# Patient Record
Sex: FEMALE | Race: WHITE | NOT HISPANIC OR LATINO | Employment: OTHER | URBAN - NONMETROPOLITAN AREA
[De-identification: names, ages, dates, MRNs, and addresses within clinical notes are randomized per-mention and may not be internally consistent; named-entity substitution may affect disease eponyms.]

---

## 2017-01-10 LAB
ALBUMIN SERPL-MCNC: 3.9 GM/DL (ref 3.4–4.8)
ALP SERPL-CCNC: 133 U/L (ref 38–126)
ALT SERPL-CCNC: 29 U/L (ref 9–52)
ANION GAP SERPL CALCULATED.3IONS-SCNC: 14 MMOL/L (ref 5–15)
APPEARANCE UR: CLEAR
APTT PPP: 38.2 SECONDS (ref 20–40.3)
AST SERPL-CCNC: 29 U/L (ref 14–36)
BASOPHILS # BLD AUTO: 0.03 X1000/UL (ref 0–0.2)
BASOPHILS NFR BLD AUTO: 0.5 % (ref 0–2)
BILIRUB SERPL-MCNC: 0.5 MG/DL (ref 0.2–1.3)
BUN SERPL-MCNC: 15 MG/DL (ref 7–21)
CALCIUM SERPL-MCNC: 9.2 MG/DL (ref 8.4–10.2)
CHLORIDE SERPL-SCNC: 93 MMOL/L (ref 95–110)
CK MB SERPL-MCNC: 2.2 NG/ML (ref 0–5)
CK MB SERPL-MCNC: 2.4 NG/ML (ref 0–5)
CK SERPL-CCNC: 47 U/L (ref 30–135)
CK SERPL-CCNC: 47 U/L (ref 30–135)
CO2 SERPL-SCNC: 22 MMOL/L (ref 22–31)
COLOR UR: YELLOW
CONV AUTO IG#: 0.01 X1000/UL (ref 0.01–0.02)
CONV AUTO IG%: 0.2 % (ref 0–0.5)
CREAT SERPL-MCNC: 1 MG/DL (ref 0.5–1)
EOSINOPHIL # BLD AUTO: 0.04 X1000/UL (ref 0–0.7)
EOSINOPHIL NFR BLD AUTO: 0.6 % (ref 0–7)
ERYTHROCYTE [DISTWIDTH] IN BLOOD: 15.4 % (ref 11.5–14.5)
GLUCOSE SERPL-MCNC: 123 MG/DL (ref 60–100)
GLUCOSE UR STRIP-MCNC: NEGATIVE MG/DL
GRANULOCYTES # BLD AUTO: 4.53 X1000/UL (ref 2–8.6)
GRANULOCYTES NFR BLD AUTO: 69.9 % (ref 37–80)
HCT VFR BLD CALC: 41 % (ref 35–45)
HGB BLD-MCNC: 14 GM/DL (ref 12–15.5)
INR PPP: 2.2 (ref 0.8–1.2)
KETONES UR QL STRIP: NEGATIVE
LEUKOCYTE ESTERASE UR QL STRIP: NEGATIVE
LIPASE SERPL-CCNC: 65 U/L (ref 23–300)
LYMPHOCYTES # BLD AUTO: 1 X1000/UL (ref 0.6–4.2)
LYMPHOCYTES NFR BLD AUTO: 15.5 % (ref 10–50)
MAGNESIUM SERPL-MCNC: 1.76 MG/DL (ref 1.6–2.3)
MCH RBC QN: 29.2 PG (ref 26–34)
MCHC RBC-ENTMCNC: 34.1 GM/DL (ref 31.4–36)
MCV RBC: 85.6 FL (ref 80–98)
MONOCYTES # BLD AUTO: 0.86 X1000/UL (ref 0–0.9)
MONOCYTES NFR BLD AUTO: 13.3 % (ref 0–12)
NITRITE UR QL STRIP: NEGATIVE
NRBC BLD AUTO-RTO: 0 % (ref 0–0.2)
NRBC SPEC MANUAL: 0 X1000/UL
NT-PROBNP SERPL-MCNC: 1420 PG/ML (ref 0–1800)
PH UR STRIP: 7.5 PH UNITS (ref 5–9)
PLATELET # BLD: 293 X1000/MM3 (ref 150–450)
PMV BLD: 9.5 FL (ref 8–12)
POTASSIUM SERPL-SCNC: 4.1 MMOL/L (ref 3.5–5.1)
PROT SERPL-MCNC: 7.3 GM/DL (ref 6.3–8.6)
PROT UR QL STRIP: NEGATIVE
PROTHROMBIN TIME: 23.6 SECONDS (ref 11.1–15.3)
RBC # BLD: 4.79 MEGA/MM3 (ref 3.77–5.16)
RBC # UR STRIP: NEGATIVE /UL
SODIUM SERPL-SCNC: 129 MMOL/L (ref 137–145)
SP GR UR STRIP: 1.01 (ref 1–1.03)
TROPONIN I SERPL-MCNC: 0.04 NG/ML (ref 0–0.03)
TROPONIN I SERPL-MCNC: 0.05 NG/ML (ref 0–0.03)
TSH SERPL-ACNC: 2.75 UIU/ML (ref 0.46–4.68)
UROBILINOGEN UR STRIP-MCNC: 1 EU/DL
WBC # BLD: 6.5 X1000/UL (ref 3.2–9.8)

## 2017-01-10 PROCEDURE — 9990

## 2017-01-10 PROCEDURE — 74176 CT ABD & PELVIS W/O CONTRAST: CPT

## 2017-01-10 PROCEDURE — 96375 TX/PRO/DX INJ NEW DRUG ADDON: CPT

## 2017-01-10 PROCEDURE — 96361 HYDRATE IV INFUSION ADD-ON: CPT

## 2017-01-10 PROCEDURE — 71010: CPT

## 2017-01-10 PROCEDURE — 96366 THER/PROPH/DIAG IV INF ADDON: CPT

## 2017-01-10 PROCEDURE — 9990 URINALYSIS

## 2017-01-10 PROCEDURE — 82553 CREATINE MB FRACTION: CPT

## 2017-01-10 PROCEDURE — 84484 ASSAY OF TROPONIN QUANT: CPT

## 2017-01-10 PROCEDURE — 9990 CBC

## 2017-01-10 PROCEDURE — 81003 URINALYSIS AUTO W/O SCOPE: CPT

## 2017-01-10 PROCEDURE — 85730 THROMBOPLASTIN TIME PARTIAL: CPT

## 2017-01-10 PROCEDURE — 85025 COMPLETE CBC W/AUTO DIFF WBC: CPT

## 2017-01-10 PROCEDURE — 85610 PROTHROMBIN TIME: CPT

## 2017-01-10 PROCEDURE — 83735 ASSAY OF MAGNESIUM: CPT

## 2017-01-10 PROCEDURE — 83880 ASSAY OF NATRIURETIC PEPTIDE: CPT

## 2017-01-10 PROCEDURE — 80053 COMPREHEN METABOLIC PANEL: CPT

## 2017-01-10 PROCEDURE — 96365 THER/PROPH/DIAG IV INF INIT: CPT

## 2017-01-10 PROCEDURE — 83690 ASSAY OF LIPASE: CPT

## 2017-01-10 PROCEDURE — 9990 COMPREHENSIVE METABOLIC PANEL

## 2017-01-10 PROCEDURE — 36415 COLL VENOUS BLD VENIPUNCTURE: CPT

## 2017-01-10 PROCEDURE — 93005 ELECTROCARDIOGRAM TRACING: CPT

## 2017-01-10 PROCEDURE — 99285 EMERGENCY DEPT VISIT HI MDM: CPT

## 2017-01-10 PROCEDURE — 82550 ASSAY OF CK (CPK): CPT

## 2017-01-10 PROCEDURE — 84443 ASSAY THYROID STIM HORMONE: CPT

## 2017-01-10 PROCEDURE — 96376 TX/PRO/DX INJ SAME DRUG ADON: CPT

## 2017-01-11 LAB
CK MB SERPL-MCNC: 2.3 NG/ML (ref 0–5)
CK MB SERPL-MCNC: 2.4 NG/ML (ref 0–5)
CK MB SERPL-MCNC: 2.6 NG/ML (ref 0–5)
CK SERPL-CCNC: 39 U/L (ref 30–135)
MAGNESIUM SERPL-MCNC: 1.81 MG/DL (ref 1.6–2.3)
PREALB SERPL-MCNC: 16.1 MG/DL (ref 17.6–36)
TROPONIN I SERPL-MCNC: 0.05 NG/ML (ref 0–0.03)
TROPONIN I SERPL-MCNC: 0.06 NG/ML (ref 0–0.03)
TROPONIN I SERPL-MCNC: 0.07 NG/ML (ref 0–0.03)

## 2017-01-11 PROCEDURE — 84134 ASSAY OF PREALBUMIN: CPT

## 2017-01-11 PROCEDURE — 9990

## 2017-01-11 PROCEDURE — 9990 PREALBUMIN

## 2017-01-11 PROCEDURE — 82553 CREATINE MB FRACTION: CPT

## 2017-01-11 PROCEDURE — 83735 ASSAY OF MAGNESIUM: CPT

## 2017-01-11 PROCEDURE — 93225 XTRNL ECG REC<48 HRS REC: CPT

## 2017-01-11 PROCEDURE — 82550 ASSAY OF CK (CPK): CPT

## 2017-01-11 PROCEDURE — 93005 ELECTROCARDIOGRAM TRACING: CPT

## 2017-01-11 PROCEDURE — 84484 ASSAY OF TROPONIN QUANT: CPT

## 2017-01-11 NOTE — ED PROVIDER NOTES
"  Clinical Report - Physicians  Patient: KATHLEEN CHEATHAM    MRN: [id number] [facility]  VisitID: [id number] [address] [place] [address], [place] [id number] [phone]  83y, F Registration Date/Time: 01/10/2017 20:35      Time Seen: 20:38 Jakub 10 2017; initial patient contact.    Arrived- By ambulance.  Historian- patient and EMS personnel.      HISTORY OF PRESENT ILLNESS  Chief Complaint: CHEST PAIN.  This started just prior to arrival today and is   still present.  Onset during light activity.  At its maximum, severity   described as moderate. When seen in the E.D., severity described as moderate.   Modifying factors. Not worsened by anything. Not relieved by anything.  It   is described as pressure, tightness and \"pain\" and it is described as located   in the central chest area.  No radiation.  No nausea, vomiting, difficulty   breathing or diaphoresis.  No additional chest pain.      Similar symptoms previously:     Recent medical care: Not recently seen/assessed.      REVIEW OF SYSTEMS  No fever, chills, cough, pedal edema or calf pain.  No fainting episodes,   headache, sore throat, blurred vision or abdominal pain.  No black stools,   difficulty with urination, skin rash, enlarged lymph nodes or joint pain.  No   bloody stools.      PAST HISTORY  See nurses notes.  History of dysrhythmia.  Pacemaker.  Valvular heart   disease.  Chronic obstructive pulmonary disease.  Gallstones.  (Congestive   heart failure.  Chronic obstructive pulmonary disease.  Emphysema.    Pneumonia.  Thyroid disease.  Gastroesophageal reflux.  Spinal Fracture.   Contusion. Bronchitis. Hypotension. Respiratory  failure. Influenza. Syncope.   Pacemaker. Valvular Heart Disease. Hyperlipidemia. UTI - Urinary Tract   Infection. Rectal Bleed. Pulmonary  embolism. Congestive Heart Failure. Heart   Disease. GI Disease.  GI Bleeding. Vomiting. Diarrhea. Depression. Hypertension.   Hypercholesterolemia. Thyroid Disease.).  "     Surgeries: Appendectomy.  Tonsillectomy.  (Heart catheterization.  Had   hysterectomy.  Pacemaker.  Valve replacement. Cardiac Surgery.   Cholecystectomy).      Medications:  Bystolic Oral (Tablet 10 mg) 1 tablet,  daily.  Furosemide Oral (Tablet 20 mg) 1 tablet,  2x a day.  Gabapentin Oral 100 mg,  3x a day.  Levothyroxine Sodium Oral (Tablet 50 mcg) 1 tablet,  daily.  Lisinopril Oral 20 mg,  daily.  Magnesium Oxide Oral (Tablet 400 mg) 1 tablet,  daily.  Multivital Oral,  daily.  Omeprazole Oral (Tablet Delayed Release 20 mg) 1 tablet,  at bedtime.  Potassium Chloride ER Oral (Capsule Extended Release 10 meq) 1 capsule,    daily.  Stool Softener Oral (Capsule 100 mg) 1 capsule,  at bedtime.  Warfarin Sodium Oral (Tablet 1 mg) 1 tablet sun e ur sat  1/2 tab mon wed fri.  Zolpidem Tartrate Oral (Tablet 10 mg) 1 tablet,  at bedtime.    Allergies:  Aspirin.  Bacitracin.  Latex.  PCN.      SOCIAL HISTORY  Former smoker.  Alcohol use. Patient is a recovering alcoholic.  No recent   travel.  Is a local resident.      FAMILY HISTORY  Diabetes in first-degree relative.      ADDITIONAL NOTES  The nursing notes have been reviewed with agreement regarding the chief   complaint, HPI, ROS, PMH and patient medications and allergies.      PHYSICAL EXAM  Vital Signs: 01/10/2017 21:58 BP: 134/77. HR: 122. RR: 18. O2 saturation:   96%.  01/10/2017 20:49 BP: 175/88. HR: 111. RR: 20. O2 saturation: 94%. Temp: 98.5   F. Pain level now: 0/10.  Have been reviewed and appear to be correct.    Hypertensive.  Tachycardic.  Oxygen saturation low.    Appearance: Alert.  Oriented X3.  No acute distress.  She is cooperative,   appears frail and elderly, appears comfortable and shows no apparent trauma.   She has normal color and is moderately nourished.    Eyes: Eyes normal inspection.    ENT: Pharynx normal.    Neck: Normal inspection.    CVS: Normal heart rate and rhythm.  Heart sounds normal.    Respiratory: No respiratory  distress.  Breath sounds normal.    Abdomen: Soft and nontender.  Bowel sounds normal.    Back: Normal external inspection.    Skin: Skin warm.  Normal skin color.  No rash.    Extremities: Extremities exhibit normal ROM.  No lower extremity edema.    Neuro: Oriented X 3.  No motor deficit.  No sensory deficit.      LABS, X-RAYS, AND EKG  EKG: Abnormal EKG.  Atrial fibrillation (narrow-complex) (ventricular rate   119).  Non-specific ST segment / T wave abnormalities.  Prior EKG   unavailable.  The study has been interpreted contemporaneously by me.  The   EKG appears to be a good tracing.    Laboratory Tests: Laboratory tests have been ordered, with results reviewed   and considered in the medical decision making process.     Protein, Misc Fluid:    (LUDMILA: 01/10/2017 21:38)  ( MsgRcvd 01/10/2017 21:38)   Final results     **Test**                                **Result**      **Flag**      **Units**      **(Reference)**   Read By                                                                           Report                                                                             [facility]                                   --                                                                                 Patient Name- KATHLEEN CHEATHAM                                   --                                                                                 ORDERING- GOYO HIDALGO                                   --                                                                                 ATTENDING- DR CASTELAN                                    --                                                                                 REFERRING-    ,                                        -----------------------                                   --                                                                                 PROCEDURE- Chest single view on 1/10/2017                                    --                                                                                 CLINICAL INDICATION-  Precordial chest pain, dizziness                           --                                                                                 COMPARISON- 12/16/2016                                    --                                                                                 FINDINGS- The patient is status post median sternotomy and valve                  replacement. 2-lead left subclavian pacemaker is noted in place. Heart            is upper limits normal for size. Vascular calcification is noted in a             tortuous aorta. Basilar predominant increased reticular interstitial              changes appear chronic in nature and likely represents a chronic                  interstitial lung disease. No acute pulmonary opacity is noted.                  --                                                                                 CONCLUSION- Findings likely representing a chronic interstitial lung              disease with no acute abnormality.                                   --                                                                                 Electronically Signed By- Jose Silveira MD On- 2017-01-10 21-36-33           --                                                                                      Reading Radiologist- JUSTINO SILVEIRA                                        Releasing Radiologist- JUSTINO SILVEIRA                                      Released Date Time- 01/10/17 2138                                       ------------------------------------------------------------------------------                                                                       Released By                             [facility]                                   VENIPUNCTURE:    (LUDMILA: 01/10/2017 20:40)  ( MsgRcvd 01/10/2017 20:40) In   Progress      CARDIAC  ENZYMES X 3:    (LUDMILA: 01/10/2017 20:40)  ( [facility] 01/10/[facility] 20:40)   In Progress      CK:    (LUDMILA: 01/10/2017 21:40)  ( [facility] 01/10/2017 22:15) Final results     **Test**                                **Result**      **Flag**      **Units**      **(Reference)**   CK                                      47                          U/L        ()          CK MB:    (LUDMILA: 01/10/2017 21:40)  ( [facility] 01/10/2017 22:24) Final results     **Test**                                **Result**      **Flag**      **Units**      **(Reference)**   CK-MB                                   2.2                         ng/ml      (0.0-5.0)         Troponin-I:    (LUDMILA: 01/10/2017 21:40)  ( [facility] 01/10/2017 22:24) Final   results     **Test**                                **Result**      **Flag**      **Units**      **(Reference)**   Troponin-I                              0.039           H           ng/ml      (0.000-0.034)    Rising and/or falling troponin values, in conjunction with symptoms, newECG   changes, or new imaging abnormalities suggestive of cardiac ischemiaare   consistent with acute myocardial injury. (Universal Definition ofMyocardial   Infarction, Circulation. 2007; 1463-1895.)      CK:    (LUDMILA: 01/10/2017 20:40)  ( [facility] 01/10/2017 20:41) In Progress      CK MB:    (LUDMILA: 01/10/2017 20:40)  ( [facility] 01/10/2017 20:41) In Progress      Troponin-I:    (LUDMILA: 01/10/2017 20:40)  ( [facility] 01/10/2017 20:41) In   Progress      CK:    (LUDMILA: 01/10/2017 20:40)  ( [facility] 01/10/2017 20:41) In Progress      CK MB:    (LUDMILA: 01/10/2017 20:40)  ( [facility] 01/10/2017 20:41) In Progress      Troponin-I:    (LUDMILA: 01/10/2017 20:40)  ( [facility] 01/10/2017 20:41) In   Progress      Urinalysis:    (LUDMILA: 01/10/2017 21:25)  ( MsgRcvd 01/10/2017 21:55) Final   results     **Test**                                **Result**      **Flag**      **Units**      **(Reference)**   Color                                   YELLOW                                  (YELLOW,Yellow   Clarity                                 CLEAR                                  (CLEAR,Clear)    Spec Gravity                            1.015                                  (1.003-1.030)    pH, Urine                               7.5             H           pH Units   (5.0-9.0)        pH Normal: 5.0 - 9.0     Leukocyte Esterase                      NEGATIVE                               (Negative,NEGA   Nitrites                                NEGATIVE                               (Negative,NEGA   Protein, Urine                          NEGATIVE                               (Negative,NEGA   Glucose, Urine                          NEGATIVE                    mg/dl      (Negative,NEGA   Ketone                                  NEGATIVE                               (Negative,NEGA   Urobilinogen                            1.0             H           EU/dl      (0.2)            Blood,Urine                             NEGATIVE                               (NEGATIVE)        CBC:    (LUDMILA: 01/10/2017 21:40)  ( MsgRcvd 01/10/2017 22:45) Final results     **Test**                                **Result**      **Flag**      **Units**      **(Reference)**   WBC                                     6.5                         x1000/uL   (3.2-9.8)        RBC                                     4.79                        evelyn/mm3   (3.77-5.16)      Hemoglobin                              14.0                        gm/dl      (12.0-15.5)      Hematocrit                              41.0                        %          (35.0-45.0)      MCV                                     85.6                        fl         (80.0-98.0)      MCH                                     29.2                        pg         (26.0-34.0)      MCHC                                    34.1                        gm/dl      (31.4-36.0)      RDW                                     15.4            H            %          (11.5-14.5)      Platelet Count                          293                           x1000/mm3       (150-450)        MPV                                     9.5                         fl         (8.0-12.0)       Auto Segs %                             69.9                        %          (37.0-80.0)      Auto Lymph %                            15.5                        %          (10.0-50.0)      Auto Mono %                             13.3            H           %          (0.0-12.0)       Auto Eos %                              0.6                         %          (0.0-7.0)        Auto Baso %                             0.5                         %          (0.0-2.0)        Auto IG%                                0.20                        %          (0.00-0.50)      Auto Segs #                             4.53                        x1000/uL   (2.00-8.60)      Auto Lymph #                            1.00                        x1000/uL   (0.60-4.20)      Auto Mono #                             0.86                        x1000/uL   (0.00-0.90)      Auto Eos #                              0.04                        x1000/uL   (0.00-0.70)      Auto Baso #                             0.03                        x1000/uL   (0.00-0.20)      Auto IG#                                0.010                       x1000/uL   (0.005-0.022)    NRBC %                                  0.0                         %          (0.0-0.2)        NRBC #                                  0.000                       x1000/uL       TSH:    (LUDMILA: 01/10/2017 21:40)  ( MsgRcvd 01/10/2017 21:51) In Progress      Magnesium:    (LUDMILA: 01/10/2017 21:40)  ( MsgRcvd 01/10/2017 22:16) Final   results     **Test**                                **Result**      **Flag**      **Units**      **(Reference)**   Magnesium                               1.76                        mg/dl      (1.60-2.30)       NT-proBNP:     (LUDMILA: 01/10/2017 21:40)  ( MsgRcvd 01/10/2017 22:21) Final   results     **Test**                                **Result**      **Flag**      **Units**      **(Reference)**   NT-proBNP                               1420                        pg/ml      (0-1800)         Intermediate values above 300 pg/mL may be due to pulmonary disease,renal   disease, heart disease of any type, sepsis, anemia, or othersevere illness.      Lipase:    (LUDMILA: 01/10/2017 21:40)  ( MsgRcvd 01/10/2017 22:16) Final   results     **Test**                                **Result**      **Flag**      **Units**      **(Reference)**   Lipase                                  65                          U/L        ()          CMP:    (LUDMILA: 01/10/2017 21:40)  ( MsgRcvd 01/10/2017 22:16) Final results     **Test**                                **Result**      **Flag**      **Units**      **(Reference)**   Sodium                                  129             L           mmol/L     (137-145)        Potassium                               4.1                         mmol/L     (3.5-5.1)        Chloride                                93              L           mmol/L     ()         CO2                                     22                          mmol/L     (22-31)          Anion Gap                               14.0                        mmol/L     (5.0-15.0)       Glucose                                 123             H           mg/dl      ()         BUN                                     15                          mg/dl      (7-21)           Creatinine                              1.0                         mg/dl      (0.5-1.0)        GFR (non AA)                            53                            mL/min/1.73 sq. (39-90)          Invalid if creatinine is changing or the patient is on dialysis. Use   AAresult if patient is -American, non AA result otherwise.     GFR (AA)                                 64                            mL/min/1.73 sq. (39-90)          Calcium, Total                          9.2                         mg/dl      (8.4-10.2)       Protein,Total                           7.3                         gm/dl      (6.3-8.6)        Albumin                                 3.9                         gm/dl      (3.4-4.8)        Bili., Total                            0.5                         mg/dl      (0.2-1.3)        Alk. Phos.                              133             H           U/L        ()         AST                                     29                          U/L        (14-36)          ALT                                     29                          U/L        (9-52)            PTT:    (LUDMILA: 01/10/2017 21:40)  ( [facility]48) Final results     **Test**                                **Result**      **Flag**      **Units**      **(Reference)**   APTT                                    38.2                        seconds    (20.0-40.3)      The recommended Heparin therapeutic range is 68 - 97 seconds.      PT with INR:    (LUDMILA: 01/10/2017 21:40)  ( [facility] 01/10/[facility] 22:48) Final   results     **Test**                                **Result**      **Flag**      **Units**      **(Reference)**   Pro Time                                23.6            H           seconds    (11.1-15.3)      INR                                     2.2             H                      (0.8-1.2)        Therapeutic range for most indications is 2.0 - 3.0 INR or 2.5 - 3.5   formechanical heart valves.        .      PROGRESS AND PROCEDURES  Discussed case with hospitalist, (call placed 22:21 call returned by Dr Marroquin). Reviewed test results. Agreed upon treatment plan and decision to   place in observation. Health care provider will see patient in hospital.    Patient counseled in person regarding the patient's stable condition, test   results, diagnosis and need for  "follow-up.      Observation orders written.      Disposition: Admitted to MED / Surg / Tele. UTI (catheter associated) was not   present prior to admission. Pressure ulcer was not present prior to   admission. Vascular infection (catheter associated) was not present prior to   admission. Surgical site infection was not present prior to admission. An   object left in surgery was not present prior to admission. Blood   incompatibility was not present prior to admission. Air embolism was not   present prior to admission.    Admit decision based on need for further evaluation, additional testing,   monitoring, observation, IV therapy and medications and stabilization of   condition.      CLINICAL IMPRESSION  Chest pain characterized as \"pressure\" .12 lead EKG performed.     Abnormal EKG: atrial fibrillation and nonspecific ST-T wave changes.                (Electronically signed by Yordan Hoffman M.D. 01/11/2017 2:14)             Patient: KATHLEEN CHEATHAM  Clinical Report - Nurses   MRN: [id number] [facility]  VisitID: [id number] [address] [place] [id number] [phone]  83y, F Registration Date/Time: 01/10/2017 20:35          TRIAGE    20:49 01/10/17.  BP: 175/88. HR: 111. RR: 20. O2 saturation: 94% on room air.   Temp: 98.5 F. Pain level now: 0/10.  --20:51 Ceferino Sims    Triage time 20:49 Jakub 10 2017.  Acuity: LEVEL 2.    Chief Complaint: CHEST PAIN and (dizziness).    Alert.  No acute distress.      SEPSIS SCREEN: Sepsis Screen: negative. Negative (no infection   suspected/documented). Heart rate greater than 90. Temperature not greater   than 38.3 degrees C (101 degrees F) or less than 36 degrees C (96.8 degrees   F). Respiratory rate not greater than 20. No acute mental status change or   other signs of organ dysfunction present. Systolic blood pressure not less   than 90 or decreased greater than 40mmHg from baseline. Mean arterial   pressure not less than 65.  --20:51 Queen" "Ceferino.      Medications  Bystolic Oral (Tablet 10 mg) 1 tablet,  daily.  Furosemide Oral (Tablet 20 mg) 1 tablet,  2x a day.  Gabapentin Oral 100 mg,  3x a day.  Levothyroxine Sodium Oral (Tablet 50 mcg) 1 tablet,  daily.  Lisinopril Oral 20 mg,  daily.  Magnesium Oxide Oral (Tablet 400 mg) 1 tablet,  daily.  Multivital Oral,  daily.  Omeprazole Oral (Tablet Delayed Release 20 mg) 1 tablet,  at bedtime.  Potassium Chloride ER Oral (Capsule Extended Release 10 meq) 1 capsule,    daily.  Stool Softener Oral (Capsule 100 mg) 1 capsule,  at bedtime.  Warfarin Sodium Oral (Tablet 1 mg) 1 tablet sun gabby salazar sat  1/2 tab mon wed fri.  Zolpidem Tartrate Oral (Tablet 10 mg) 1 tablet,  at bedtime.  --20:49 Ceferino Sims.      Allergies  Aspirin.  Bacitracin.  Latex.  PCN.  --20:49 Ceferino Sims.      History  Arrived by EMS.  Historian: EMS and patient.  Accompanied by family.    SOCIAL HX: Unknown if ever smoked.  No alcohol use or drug use.  No   infectious disease exposure.      ABUSE ASSESSMENT: No report of abuse.      SELF HARM ASSESSMENT: A self harm assessment was performed. The patient   answered \"no\" to the question \"Do you have thoughts of harming or killing   yourself?\" and \"Have you recently had thoughts about harming or killing   others?\". The patient reports their behavior.      NUTRITIONAL RISK ASSESSMENT: The nutritional risk assessment revealed no   deficiencies.      FUNCTIONAL ASSESSMENT: Functional assessment: no impairments noted.      LEARNING NEEDS ASSESSMENT: The learning needs assessment revealed no   barriers.      FALL RISK ASSESSMENT: Fall risk assessment completed. Risk factors identified   include patient age greater than 65 years. Fall interventions initiated. Side   rails up x2. Brakes on Bed in low position. Patient identified as a fall risk   by ID band. Family at bedside. Call light in reach of patient. Instructed not   to get up without assistance.      SKIN INTEGRITY ASSESSMENT: Skin " integrity risk assessment completed. No skin   integrity risk identified.  --20:51 Ceferino Sims    Treatment PTA:  NITROGLYCERIN X2 SL.  --20:52 Ceferino Sims.      PROBLEMS:  Gallstone(s).  Sepsis.  Acute Myocardial Infarction.  Anxiety Reaction.  Hyperventilation.  Atrial Fibrillation.  Hypoxia.  Dyspnea.  Gastroesophageal Reflux.  Pneumonia.  COPD - Chronic Obstructive Pulmonary Disease.  Emphysema.  Spinal Fracture.  Contusion.  Bronchitis.  Hypotension.  Respiratory Failure.  Influenza.  Syncope.  Pacemaker.  Valvular Heart Disease.  Hyperlipidemia.  UTI - Urinary Tract Infection.  Rectal Bleed.  Pulmonary Embolism.  Congestive Heart Failure.  Heart Disease.  GI Disease.  GI Bleeding.  Vomiting.  Diarrhea.  Depression.  Hypertension.  Hypercholesterolemia.  Thyroid Disease.  --20:50 Ceferino Sims.      ADDITIONAL SURGERIES:  Cardiac Catheterization.  Cardiac Surgery.  Cholecystectomy.  Gallbladder Surgery.  Hysterectomy.  Pacemaker.  Valve Replacement.  --20:50 Ceferino Sims.      Interventions  ID and allergy band on patient.  To treatment room.  --20:51 Ceferino Sims.      PHYSICAL ASSESSMENT  ( Pt states she is no longer having chest pain, but is feeling dizzy.).    GENERAL / NEURO / PSYCH: Alert.  Oriented X 4.  Appears in no acute distress.   RESPIRATORY: Respirations not labored.  Chest nontender.  Breath sounds   within normal limits.    CVS: Cardiac rhythm: sinus tachycardia; 1st degree AV block; PACs.    GI / : Abdomen soft and nontender.    EXTREMITIES: Lower extremity edema.    SKIN: Skin is warm and dry.  Normal skin turgor.  Skin is non-tender.    --20:52 Ceferino Sims.      NURSING PROGRESS NOTES  <<STRICKEN ENTRY-- 20:47 01/10/2017 Site #1 started prior to arrival by EMS   via IV in the right antecubital space with an 22g angiocath (per EMS).    --20:47 Ceferino Sims  --END STRIKE>> Correction. --20:52 Ceferino Sims    20:47 01/10/2017 Site #1 started prior to arrival by EMS via IV in the right    antecubital space with an 24g angiocath; one attempt (per EMS).  --20:52   Ceferino Sims    20:58 01/10/2017 Started bag #1 1000 mL IV Fluids NS W/BOLUS (Saline); at 75   mL/hr via site #1. Allergies verified and confirmed 5 rights. IV patency   established. IV site checked: no pain, redness, or swelling. IV flushed   thoroughly pre- and post-medication administration. Completed per protocol.    --20:58 Ceferino Sims    21:03 01/10/2017 ZOFRAN (Ondansetron HCl) IVP 4 mg given. via site #1.   Allergies verified and confirmed 5 rights. IV patency established. IV site   checked: no pain, redness, or swelling. IV flushed thoroughly pre- and   post-medication administration. IVP given by RN.  --21:03 Ceferino Sims    Patient ID band checked for patient name and birthdate: patient confirmed.   Blood samples drawn from the left antecubital space by nurse per protocol ;   labeled in presence of the patient: rainbow set: cardiac enzymes (1st set).    --21:03 Ceferino Sims    Patient ID band checked for patient name and birthdate: patient confirmed.   Clean catch urine collected with return of yellow-colored clear urine; sample   sent to lab for urinalysis. Specimen labeled in the presence of the patient   (per Brea).  --21:30 Halima Goodson R.N.    Patient ID band checked for patient name and birthdate: patient confirmed.   Clean catch urine collected with return of yellow-colored clear urine; odor   is normal; sample sent to lab for urinalysis. Specimen labeled in the   presence of the patient.  --21:32 Jose Forrest    EKG time: (2037).  EKG was ordered, performed by a tech and shown to the ED   physician.  shown to Dr. Hoffman with previous.  --21:33 Jose Forrest    EKG time: (2116).  EKG was ordered, performed by a tech and shown to the ED   physician.  repeat shown to Dr. Hoffman.  --21:34 Jose Forrest    21:58 01/10/17.  BP: 134/77. HR: 122. RR: 18. O2 saturation: 96% on nasal   cannula at 2  liters/minute. Pain level now: uncertain.  --21:59 SimsCeferino    21:40.  Patient ID band checked for patient name, birthdate and medical   record number: patient confirmed. Blood samples drawn by lab ; labeled in   presence of the patient and sent to lab: rainbow set: cardiac enzymes (1st   set).  --22:00 Gladis Nuñez    22:09 01/10/2017 NITROGLYCERIN SL Tablets 0.4 mg given. Allergies verified   and confirmed 5 rights.  --22:16 Ceferino Sims    22:09 01/10/2017 NITROGLYCERIN PASTE Transdermal Ointment. Applied to the   right upper back. Allergies verified and confirmed 5 rights.  --22:16 Ceferino Sims    ( Pharmacy faxed for medication).  --22:32 Queen Ceferino    22:45 01/10/2017 MORPHINE IVP 2 mg given. via site #1. Allergies verified and   confirmed 5 rights. IV patency established. IV site checked: no pain,   redness, or swelling. IV flushed thoroughly pre- and post-medication   administration. IVP given by RN.  --23:10 Queen Ceferino    22:45 01/10/2017 ZOFRAN (Ondansetron HCl) IVP 4 mg given. via site #1.   Allergies verified and confirmed 5 rights. IV patency established. IV site   checked: no pain, redness, or swelling. IV flushed thoroughly pre- and   post-medication administration. IVP given by RN.  --23:10 Ceferino Sims    22:48 01/10/2017 Started 125 mg of CARDIZEM Drip IV in bag #1 125 mL; bolus   of 15 mg over 3 minute(s) then at 5 mg/hr via site #1 via IV pump. Allergies   verified and confirmed 5 rights. IV patency established. IV site checked: no   pain, redness, or swelling. IV flushed thoroughly pre- and post-medication   administration. Completed per protocol.  --23:08 Ceferino Sims    23:04 01/10/2017 Site #2 started via IV in the right hand with an 22g   angiocath, with aseptic technique and good blood return; one attempt. Saline   lock flushed with 10 mL saline (per JW Figueroa).  --23:09 Queen Ceferino    23:05 01/10/2017 Started 04129 mcg of NITROGLYCERIN Drip IV in bag #1 250 mL;   at 3 mL/hr  via site #2 via IV pump. Allergies verified and confirmed 5   rights. IV patency established. IV site checked: no pain, redness, or   swelling. IV flushed thoroughly pre- and post-medication administration.   Completed per protocol.  --23:10 Ceferino Sims    23:11 01/10/17.  BP: 154/72. HR: 65. RR: 20. O2 saturation: 97% on nasal   cannula at 2 liters/minute. Pain level now: uncertain. Additional comments:   Pt is still complaining of abdominal and back pain.  --23:12 Ceferino Sims    23:27 01/10/2017 OXYCODONE PO Tablets 10 mg given. Allergies verified,   confirmed 5 rights and sedative warning given to the patient.  --23:27 Ceferino Sims    23:53 01/10/17.  ( Room 334 assigned at 2350. Telemetry faxed).  --23:53   Jeannie Rahman, [facility]    Patient returned from CT by stretcher with tech.  --00:02 Ceferino Sims    00:21 01/11/2017 OXYCODONE PO Response: no adverse reaction pain is   improving. Symptoms have improved the patient feels better.  --00:21 Ceferino Sims    00:51 01/11/2017 NITROGLYCERIN Drip IV Continued: upon admission at the rate   of 3 mL/hr. IV patency established. IV site checked: no pain, redness, or   swelling. IV flushed thoroughly.  --00:51 Ceferino Sims    00:51 01/11/2017 CARDIZEM Drip IV Continued: upon admission at the rate of 5   mg/hr. IV patency established. IV site checked: no pain, redness, or   swelling. IV flushed thoroughly.  --00:51 Ceferino Sims    00:52 01/11/2017 IV Fluids NS W/BOLUS Discontinued: discontinued. IV patency   established. IV site checked: no pain, redness, or swelling. IV flushed   thoroughly.  --00:52 Ceferino Sims.      DISPOSITION / DISCHARGE  00:13 01/11/17.  BP: 181/78. HR: 95. RR: 17. O2 saturation: 97% on nasal   cannula at 2 liters/minute. Pain level now: 6/10.  --00:13 Ceferino Sims    Report was given to a nurse via a phone call. Report included patient's care,   treatment, medications, reviewed medication reconcilliation, and condition   (including  any recent changes or anticipated changes). All questions were   answered. Report was acknowledged. (JW Nolan).  --00:27 Ceferino Sims    Departure time: 00:51 Jan 11 2017.  --00:51 Ceferino Sims.            Locked/Released at 01/11/2017 0:52 by Ceferino Sims,

## 2017-01-12 LAB
ALBUMIN SERPL-MCNC: 3.1 GM/DL (ref 3.4–4.8)
ALP SERPL-CCNC: 92 U/L (ref 38–126)
ALT SERPL-CCNC: 31 U/L (ref 9–52)
ANION GAP SERPL CALCULATED.3IONS-SCNC: 11 MMOL/L (ref 5–15)
AST SERPL-CCNC: 20 U/L (ref 14–36)
BASOPHILS # BLD AUTO: 0.04 X1000/UL (ref 0–0.2)
BASOPHILS NFR BLD AUTO: 0.6 % (ref 0–2)
BILIRUB SERPL-MCNC: 0.5 MG/DL (ref 0.2–1.3)
BUN SERPL-MCNC: 12 MG/DL (ref 7–21)
CALCIUM SERPL-MCNC: 8.9 MG/DL (ref 8.4–10.2)
CHLORIDE SERPL-SCNC: 103 MMOL/L (ref 95–110)
CHOLEST SERPL-MCNC: 163 MG/DL (ref 0–199)
CK MB SERPL-MCNC: 2.2 NG/ML (ref 0–5)
CO2 SERPL-SCNC: 25 MMOL/L (ref 22–31)
CONV AUTO IG#: 0.01 X1000/UL (ref 0.01–0.02)
CONV AUTO IG%: 0.2 % (ref 0–0.5)
CREAT SERPL-MCNC: 1 MG/DL (ref 0.5–1)
EOSINOPHIL # BLD AUTO: 0.18 X1000/UL (ref 0–0.7)
EOSINOPHIL NFR BLD AUTO: 2.8 % (ref 0–7)
ERYTHROCYTE [DISTWIDTH] IN BLOOD: 15.4 % (ref 11.5–14.5)
GLUCOSE SERPL-MCNC: 77 MG/DL (ref 60–100)
GRANULOCYTES # BLD AUTO: 3.35 X1000/UL (ref 2–8.6)
GRANULOCYTES NFR BLD AUTO: 51.3 % (ref 37–80)
HBA1C MFR BLD CALC: 6.1 %TOTHGB (ref 4–5.6)
HCT VFR BLD CALC: 39.8 % (ref 35–45)
HDLC SERPL-MCNC: 39 MG/DL (ref 60–200)
HGB BLD-MCNC: 13.3 GM/DL (ref 12–15.5)
INR PPP: 1.4 (ref 0.8–1.2)
LDLC SERPL CALC-MCNC: 98 MG/DL (ref 0–129)
LYMPHOCYTES # BLD AUTO: 1.95 X1000/UL (ref 0.6–4.2)
LYMPHOCYTES NFR BLD AUTO: 30 % (ref 10–50)
MCH RBC QN: 29.4 PG (ref 26–34)
MCHC RBC-ENTMCNC: 33.4 GM/DL (ref 31.4–36)
MCV RBC: 87.9 FL (ref 80–98)
MONOCYTES # BLD AUTO: 0.98 X1000/UL (ref 0–0.9)
MONOCYTES NFR BLD AUTO: 15.1 % (ref 0–12)
PLATELET # BLD: 243 X1000/MM3 (ref 150–450)
PMV BLD: 10.2 FL (ref 8–12)
POTASSIUM SERPL-SCNC: 4.8 MMOL/L (ref 3.5–5.1)
PROT SERPL-MCNC: 5.7 GM/DL (ref 6.3–8.6)
PROTHROMBIN TIME: 16.9 SECONDS (ref 11.1–15.3)
RBC # BLD: 4.53 MEGA/MM3 (ref 3.77–5.16)
SODIUM SERPL-SCNC: 139 MMOL/L (ref 137–145)
TRIGL SERPL-MCNC: 129 MG/DL (ref 20–199)
TROPONIN I SERPL-MCNC: 0.06 NG/ML (ref 0–0.03)
TSH SERPL-ACNC: 3.54 UIU/ML (ref 0.46–4.68)
WBC # BLD: 6.5 X1000/UL (ref 3.2–9.8)

## 2017-01-12 PROCEDURE — 85610 PROTHROMBIN TIME: CPT

## 2017-01-12 PROCEDURE — 80061 LIPID PANEL: CPT

## 2017-01-12 PROCEDURE — 84484 ASSAY OF TROPONIN QUANT: CPT

## 2017-01-12 PROCEDURE — 82553 CREATINE MB FRACTION: CPT

## 2017-01-12 PROCEDURE — 85025 COMPLETE CBC W/AUTO DIFF WBC: CPT

## 2017-01-12 PROCEDURE — 9990

## 2017-01-12 PROCEDURE — 9990 CBC

## 2017-01-12 PROCEDURE — 9990 COMPREHENSIVE METABOLIC PANEL

## 2017-01-12 PROCEDURE — 84443 ASSAY THYROID STIM HORMONE: CPT

## 2017-01-12 PROCEDURE — 93225 XTRNL ECG REC<48 HRS REC: CPT

## 2017-01-12 PROCEDURE — 83036 HEMOGLOBIN GLYCOSYLATED A1C: CPT

## 2017-01-12 PROCEDURE — 80053 COMPREHEN METABOLIC PANEL: CPT

## 2017-01-12 NOTE — H&P
[facility]                               HISTORY AND PHYSICAL     NAME:  KATHLEEN CHEATHAM    UNIT #:  [id number]  :  1933              ACCT #:  [id number]  ROOM:  320 1                  ADMITTED:  01/10/2017                                PHYSICIAN:  GEORGIA LEÓN MD  DICTATED:  01/10/2017  2358   TRANSCRIBED:  2017  0457        DATE OF ADMISSION:  01/10/2017     CHIEF COMPLAINT:  Chest pain.     HISTORY OF PRESENT ILLNESS:  The patient is an 83-year-old   woman who was brought to the emergency department by her family members  because she had developed a severe left-sided chest pain, which started  2 hours prior to presentation. Pain was said to be severe and located in  the left side of the chest, radiating to the left upper extremity and  felt like a pressure sensation. There was associated nausea but no  vomiting. The patient has a history of coronary artery disease and was  recently diagnosed with acute myocardial infarction, according to the  history given by the family members. In addition the patient also has a  history of chronic back pain and a review of the available medical  records did show that she indeed has severe osteoporosis with multiple  compressions fractures of the lumbar vertebrae. Upon being seen,  however, patient was moaning and was definitely in some obvious  discomfort. She kept on complaining about her lower back as well as in  the abdomen. She could not give a lot of history. She was last admitted  to this facility approximately 3 weeks ago and she was last discharged  on 2016.     PAST MEDICAL/SURGICAL HISTORY:  1.   Hypertension.  2.   Dyslipidemia.  3.   Aortic valve replacement surgery.  4.   Chronic AFib requiring anticoagulation.  5.   Diastolic heart failure with preserved ejection fraction.  6.   History of pacemaker insertion.  7.   Aortic valve replacement surgery.     ALLERGIES:  PENICILLIN.     FAMILY  HISTORY:  Significant for hypertension.     SOCIAL HISTORY:  The patient does not drink alcoholic beverages. Has a  distant history of tobacco use. Does not use illicit drugs.     HOME MEDICATIONS:  The list was not immediately available. According to  the available medical records, the patient is on:     1.   Bumetanide.  2.   Diltiazem.  3.   Docusate sodium.  4.   Gabapentin.  5.   Levothyroxine.  6.   Lidocaine topical patch.  7.   Linaclotide (Linzess).  8.   Lisinopril.  9.   Magnesium oxide.  10.  Multivitamin tablet with minerals.  11.  Omeprazole.  12.  Oxycodone.  13.  Potassium chloride.  14.  Promethazine.  15.  Warfarin.  16.  Zolpidem for sleep.     REVIEW OF SYSTEMS:  The patient did not respond to questions. She kept  moaning and complaining about her back and her abdomen.     PHYSICAL EXAMINATION:     VITAL SIGNS:  As of the time of this dictation, the vital signs were  still being recorded.     GENERAL:  Patient is an elderly, frail woman, sitting up in bed and  moaning. Only hardly responding to questions. She is in obvious painful  distress.     HEENT:  Head is normocephalic, atraumatic. Eyes are rather sunken and  patient was not really able to cooperate with physical examination, as  such, because of her current condition. Oral cavity is largely  unremarkable.     NECK:  Supple with no JVD, LAD, or thyromegaly.     RESPIRATORY:  Lungs are clear to auscultation with good air entry.     CARDIOVASCULAR:  Heart sounds 1 and 2 could be distant. Again, patient  could not really follow commands as such because she was preoccupied  with the painful symptoms she was experiencing in her back. Rhythm is  irregular.     ABDOMEN:  Full any tympanitic. Bowel sounds normal active. No objective  areas of tenderness noted. No hepatosplenomegaly.     SPINE:  There is significant tenderness on palpation of the lumbar  region. No obvious areas of abnormal skin overlying any lesions. The  curvature of the spine  appears normal.     EXTREMITIES:  Only very trace edema.     CENTRAL NERVOUS SYSTEM:  Patient is conscious, rather anxious. Responds  only occasionally to questions and requests but not fully. She seems to  be in some obvious discomfort because of the pain in her back.     DIAGNOSTIC DATA:  At the time of this dictation: Initial troponin 0.039  followed by 0.048. CK-MB within normal limits. Sodium and potassium are  129 and 4.1 respectively, chloride is 93, bicarbonate is 22, glucose is  123, BUN and creatinine are 15 and 1.0 respectively. Liver enzymes  largely unremarkable. BNP is 1420, which is actually within normal  limits. INR is 2.2, which is therapeutic. White count is 6.5, hemoglobin  and hematocrit are 14 and 41 respectively, platelet count is 293,000.  Differential is also largely unremarkable.     EKG done in the emergency department showed sinus tachycardia with first-  degree AV block with premature atrial complexes. Also evidence of left  ventricular hypertrophy with repolarization abnormalities. This was  confirmed by Dr. Yap.     Portable chest x-ray was reported as showing no evidence of active  disease.     ASSESSMENT AND PLAN:  1.   Chest pain, which is a bit typical, and considering the slight       elevation of troponin, there might be NSTEMI in the information       here.       a.   Will administer analgesics and continue with the ACS protocol.       b.   If considered necessary, we will consult Cardiology to assist            with further management.  2.   Chronic back pain.       a.   The patient has been administered analgesic therapy multiple            times in the ED. Will step up this therapy with Dilaudid since            morphine did not seem to help.       b.   It was learned from the family members that oxycodone 10 mg            given at home was all that is found to be helpful for the            patient. We will try this approach before we begin the patient            on any  other analgesic.       c.   She might need to be referred to Orthopedics, but in meantime            will make an attempt to make her comfortable.  3.   Abdominal pain. At this point, there is no objective evidence of       tenderness. Will give symptomatic therapy and monitor the patient's       responses.  4.   Will admit this patient to telemetry and make any changes in her       care as may be indicated.        GEORGIA LEÓN MD  Electronically Signed  01/11/2017 22:00:24  By GEORGIA LEÓN MD     DO/tlp  [id number]                            HISTORY AND PHYSICAL  Page #page

## 2017-01-13 LAB
ALBUMIN SERPL-MCNC: 2.9 GM/DL (ref 3.4–4.8)
ALP SERPL-CCNC: 93 U/L (ref 38–126)
ALT SERPL-CCNC: 26 U/L (ref 9–52)
ANION GAP SERPL CALCULATED.3IONS-SCNC: 9 MMOL/L (ref 5–15)
AST SERPL-CCNC: 17 U/L (ref 14–36)
BASOPHILS # BLD AUTO: 0.04 X1000/UL (ref 0–0.2)
BASOPHILS NFR BLD AUTO: 0.4 % (ref 0–2)
BILIRUB SERPL-MCNC: 0.4 MG/DL (ref 0.2–1.3)
BUN SERPL-MCNC: 17 MG/DL (ref 7–21)
CALCIUM SERPL-MCNC: 8.9 MG/DL (ref 8.4–10.2)
CHLORIDE SERPL-SCNC: 104 MMOL/L (ref 95–110)
CO2 SERPL-SCNC: 25 MMOL/L (ref 22–31)
CONV AUTO IG#: 0.02 X1000/UL (ref 0.01–0.02)
CONV AUTO IG%: 0.2 % (ref 0–0.5)
CREAT SERPL-MCNC: 1.1 MG/DL (ref 0.5–1)
EOSINOPHIL # BLD AUTO: 0.3 X1000/UL (ref 0–0.7)
EOSINOPHIL NFR BLD AUTO: 2.9 % (ref 0–7)
ERYTHROCYTE [DISTWIDTH] IN BLOOD: 15.3 % (ref 11.5–14.5)
GLUCOSE SERPL-MCNC: 103 MG/DL (ref 60–100)
GRANULOCYTES # BLD AUTO: 5.5 X1000/UL (ref 2–8.6)
GRANULOCYTES NFR BLD AUTO: 52.4 % (ref 37–80)
HCT VFR BLD CALC: 39.6 % (ref 35–45)
HGB BLD-MCNC: 12.9 GM/DL (ref 12–15.5)
INR PPP: 1.4 (ref 0.8–1.2)
LYMPHOCYTES # BLD AUTO: 3.31 X1000/UL (ref 0.6–4.2)
LYMPHOCYTES NFR BLD AUTO: 31.6 % (ref 10–50)
MAGNESIUM SERPL-SCNC: 1.89 MG/DL (ref 1.6–2.3)
MCH RBC QN: 28.8 PG (ref 26–34)
MCHC RBC-ENTMCNC: 32.6 GM/DL (ref 31.4–36)
MCV RBC: 88.4 FL (ref 80–98)
MONOCYTES # BLD AUTO: 1.31 X1000/UL (ref 0–0.9)
MONOCYTES NFR BLD AUTO: 12.5 % (ref 0–12)
PLATELET # BLD: 248 X1000/MM3 (ref 150–450)
PMV BLD: 10.3 FL (ref 8–12)
POTASSIUM SERPL-SCNC: 4.4 MMOL/L (ref 3.5–5.1)
PROT SERPL-MCNC: 5.5 GM/DL (ref 6.3–8.6)
PROTHROMBIN TIME: 17.3 SECONDS (ref 11.1–15.3)
RBC # BLD: 4.48 MEGA/MM3 (ref 3.77–5.16)
SODIUM SERPL-SCNC: 138 MMOL/L (ref 137–145)
WBC # BLD: 10.5 X1000/UL (ref 3.2–9.8)

## 2017-01-13 PROCEDURE — 93225 XTRNL ECG REC<48 HRS REC: CPT

## 2017-01-13 PROCEDURE — 9990 COMPREHENSIVE METABOLIC PANEL

## 2017-01-13 PROCEDURE — 9990

## 2017-01-13 PROCEDURE — 83735 ASSAY OF MAGNESIUM: CPT

## 2017-01-13 PROCEDURE — 80053 COMPREHEN METABOLIC PANEL: CPT

## 2017-01-13 PROCEDURE — 85610 PROTHROMBIN TIME: CPT

## 2017-01-13 PROCEDURE — 9990 CBC

## 2017-01-13 PROCEDURE — 85025 COMPLETE CBC W/AUTO DIFF WBC: CPT

## 2017-01-13 NOTE — CONSULTS
"                      [facility]                                CONSULTATION     NAME:  KATHLEEN CHEATHAM         UNIT #:  [id number]  KRISTYN  :  1933              ACCT #:  [id number]  ROOM:  320 1                  TO DOCTOR:                                GEORGIA LEÓN MD  DICTATED:  2017         TRANSCRIBED:  1626                          2017 2140        DATE OF CONSULT:  2017     REQUESTING SOURCE:  GEORGIA LEÓN MD     REASON FOR CONSULTATION:  Chest pain, positive troponin.     HISTORY OF PRESENT ILLNESS:  The patient is an 83-year-old female  located on [address] [place] [address] room 320. She was admitted to the hospital  yesterday after complaints of left sided chest pain for 2 hours. She was  brought to the emergency room. Currently, patient is chest pain free.  Upon questioning the son, who brought patient to the emergency room he  said the pain was sudden and severe in the left side of her chest  radiating down her arm. Patient herself is a poor historian. She  complains only now of abdominal pain and lower back pain. She has  compression fractures currently in her lower back.     CONCURRENT MEDICAL HISTORY:  1.   Hypertension.  2.   Dyslipidemia.  3.  4.   Chronic paroxysmal atrial fibrillation, on Warfarin.  5.   HFpEF.  6.   Dual chamber pacemaker.  7.   Severe osteoporosis with lumbar compression fractures.  8.   Hypothyroidism.  9.   History of DVT.     PAST SURGICAL HISTORY:  1.   Aortic valve replacement in  in [facility].  2.   Hysterectomy.  3.   Gallbladder removal.  4.   Appendectomy.  5.   Pacemaker implant, generator change was in 2016.     FAMILY HISTORY:  Significant for hypertension.     SOCIAL HISTORY:  Is negative for alcohol, negative for drugs. History of  tobacco use \"several years ago\".     ALLERGIES:  1.   ASPIRIN.  2.   LATEX.  3.   PENICILLIN.  4.   SIMVASTATIN.  5.   BACITRACIN.  6.   BACTRIM.     HOME MEDICATIONS:  1.   Bumex 0.5 " mg b.i.d.  2.   Cardizem 240 mg daily.  3.   Colace 100 mg 2 times a day.  4.   Neurontin 100 mg 3 times a day.  5.   Synthroid 50 mcg daily.  6.   Lisinopril 20 mg 2 times a day.  7.   Magnesium 400 mg daily.  8.   Multivitamin 1 tablet daily.  9.   Prilosec 20 mg daily.  10.  Percocet 5 mg/325 mg 1 tablet every 6 hours as needed for pain.  11.  Potassium Chloride 10 mEq by mouth daily.  12.  Coumadin 1 mg tablet as directed per home instructions.  13.  Ambien 10 mg taken at night for sleep.     Hospital medications:     14.  Tylenol 650 q.4 hours p.r.n.  15.  Mylanta 30 ml once as needed.  16.  Bumex 0.5 mg twice daily.  17.  Plavix 75 mg daily.  18.  Cardizem 240 mg at bedtime.  19.  Colace 100 mg at bedtime.  20.  Neurontin 100 mg 3 times a day.  21.  Dilaudid 0.4 mg IV p.r.n. q.4 hours.  22.  Levothyroxine 50 mcg daily.  23.  Lidocaine patch on 12 hours, off 12 hours.  24.  GI cocktail x1.  25.  Lisinopril 20 mg daily.  26.  Ativan 0.5 mg q.6 hours p.r.n. as needed for anxiety.  27.  Magnesium 400 mg daily.  28.  Multivitamin 1 tablet daily.  29.  Nitro-Quick 0.4 mg q.5 minutes p.r.n. chest pain.  30.  Zofran 4 mg q.8 hours p.r.n.  31.  Percocet 10 mg 1 tablet q.6 hours p.r.n.  32.  Protonix 40 mg at bedtime.  33.  Potassium 10 mEq daily.  34.  Coumadin, will begin at 0.5 mg, being followed by pharmacy.  35.  Nitro-Dur 0.2 mg from 7 a.m. to 7 p.m.     REVIEW OF SYSTEMS:  A 10 point review of systems completed. Positive and  pertinent data include:     GENERAL:  No fever, chills, or weight loss.     HEENT:  No vision loss or blurred vision. No hearing loss, congestion,  runny nose, or sore throat. No cervical lymphadenopathy.     CARDIOVASCULAR:  Currently no chest pain, however, chest pain was  reported on admission yesterday. She has no edema, no claudication or  palpitations.     RESPIRATORY:  She is short of air with exertion. No cough, no wheezes.     GI:  Is positive for abdominal pain, positive for nausea  and vomiting.  No diarrhea, no melena.     :  No hesitancy or burning with urination. No hematuria.     MUSCULOSKELETAL:  Severe back pain due to arthritis and compression  fractures.     NEUROLOGICAL:  Negative for dizziness, headache, CVA or TIA. No numbness  or tingling.     PSYCH:  No history of depression. She is positive for anxiety.     PHYSICAL EXAMINATION:  Last vital signs: Temperature 98.0, heart rate  105 sinus tach with 1st degree AV block, blood pressure 146/67, O2  saturation 98% on 2L nasal cannula. Pain is 7 out of 10.     GENERAL:  Patient is anxious and in obvious pain. She is unable to carry  on a conversation or relay information due to moaning and rolling back  and forth in the bed. She is alert and oriented.     HEENT:  Normocephalic head. Pupils are equal and reactive. No hearing  loss.     NEUROLOGICAL:  She is alert and oriented x3. Her face is symmetric.  Cranial nerves I-XII intact.     CARDIOVASCULAR:  S1 and S2 is heard, sinus tach with a regular rate. No  edema. Pulses are palpable in all 4 extremities.     LUNGS:  She is clear throughout. No shortness of breath at rest or  wheezing.     GI:  Soft, nondistended abdomen. Positive bowel sounds in all 4  quadrants. Positive for flatulence and belching. Tender on palpation.     MUSCULOSKELETAL:  Strength is 5 out of 5. She has full range of motion.  No cyanosis, no clubbing.     SKIN:  No rash or lesions.     DIAGNOSTIC DATA:  Current labs: Sodium 129, potassium 4.1, chloride 93,  BUN 15, creatinine 1, glucose 123, magnesium 1.81. Three sets of  troponin peak at 0.069, CK is negative, highest is 47. TSH is 2.75.  Echocardiogram 12/18/2016, ejection fraction 65% to 70%, moderate left  ventricular hypertrophy, severe pulmonary hypertension with a right  ventricular systolic pressure of 63 mmHg, moderate mitral regurg and  moderate tricuspid regurg. She has had an aortic valve replacement. CT  abdomen and pelvis done this admission did not  reveal any acute process.  It did show diverticulitis and an incidental finding of coronary  calcification. She had nuclear myocardial imaging done in July 2016, no  evidence of reversible ischemia, normal wall motion, ejection fraction  estimated at 52%. Chest x-ray done this admission showed no acute  processes. EKG done yesterday in the emergency room did reveal a sinus  tachycardia, the 2nd one interpreted by myself looked like atrial  fibrillation with rapid ventricular response, currently on telemetry  monitoring she is in sinus tach with a rate of 102 with a 1st degree AV  block.     ASSESSMENT AND RECOMMENDATION:  1.   Abnormal troponin with angina. Recommend continuing optimum medical       management. Most likely, troponin increase is nonischemic in       origin, multiple comorbidities are in place. Patient was on       Cardizem and a nitro drip on admission to the hospital. Those have       since been discontinued. Due to the possibility of the nitro drip       relieving her chest pain, since it is not present at this time;       will trial Nitro-Dur patch 0.2 mg patch applied 7 a.m. to 7 p.m.       EKG p.r.n. chest pain.  2.   Patient is known to Dr. Summers in the office. He is out       currently. Will return care back to his group in the a.m.  3.   Due to allergies to aspirin and statins, these medications are not       recommended at this time.  4.   Pharmacy has been consulted to manage Coumadin.  5.   No signs and symptoms of acute decompensated heart failure.  6.   Ortho has been consulted for lumbar compression fractures.  7.   Hospitalists are managing her acute pain and anxiety.  8.   Discussed plan of care with Dr. Guevara.     Thank you very much for the consult. Please do not hesitate to call with  any questions or concerns.        I discussed the case with the APRN. Pt. of Dr. Summers's with  admissions for non-cardiac CP and abnormal troponin. Prior MPS was low-  risk over the  summer. She is not interested in an invasive approach.  Agree with medical mgmt. Close follow-up with primary cardiologist.     Magui Guevara M.D., Ph.D., [facility]     MAGUI GUEVARA MD, PH,D, [facility]  Electronically Signed  01/13/2017 08:30:02  By MAGUI GUEVARA MD, PH,D, [facility]         GURPREET SILVA  Electronically Signed  01/12/2017 16:12:31  By GURPREET SILVA     /BS/lgl  [id number]  cc:   MAGUI GUEVARA MD, PH.D, [facility]                                CONSULTATION  Page #page

## 2017-01-14 LAB
ALBUMIN SERPL-MCNC: 2.9 GM/DL (ref 3.4–4.8)
ALP SERPL-CCNC: 89 U/L (ref 38–126)
ALT SERPL-CCNC: 24 U/L (ref 9–52)
ANION GAP SERPL CALCULATED.3IONS-SCNC: 8 MMOL/L (ref 5–15)
AST SERPL-CCNC: 17 U/L (ref 14–36)
BASOPHILS # BLD AUTO: 0.04 X1000/UL (ref 0–0.2)
BASOPHILS NFR BLD AUTO: 0.4 % (ref 0–2)
BILIRUB SERPL-MCNC: 0.5 MG/DL (ref 0.2–1.3)
BUN SERPL-MCNC: 15 MG/DL (ref 7–21)
CALCIUM SERPL-MCNC: 8.6 MG/DL (ref 8.4–10.2)
CHLORIDE SERPL-SCNC: 103 MMOL/L (ref 95–110)
CO2 SERPL-SCNC: 27 MMOL/L (ref 22–31)
CONV AUTO IG#: 0.03 X1000/UL (ref 0.01–0.02)
CONV AUTO IG%: 0.3 % (ref 0–0.5)
CREAT SERPL-MCNC: 0.9 MG/DL (ref 0.5–1)
EOSINOPHIL # BLD AUTO: 0.23 X1000/UL (ref 0–0.7)
EOSINOPHIL NFR BLD AUTO: 2.2 % (ref 0–7)
ERYTHROCYTE [DISTWIDTH] IN BLOOD: 15.7 % (ref 11.5–14.5)
GLUCOSE SERPL-MCNC: 82 MG/DL (ref 60–100)
GRANULOCYTES # BLD AUTO: 6.06 X1000/UL (ref 2–8.6)
GRANULOCYTES NFR BLD AUTO: 58.4 % (ref 37–80)
HCT VFR BLD CALC: 41.4 % (ref 35–45)
HGB BLD-MCNC: 13.2 GM/DL (ref 12–15.5)
INR PPP: 1.5 (ref 0.8–1.2)
LYMPHOCYTES # BLD AUTO: 2.79 X1000/UL (ref 0.6–4.2)
LYMPHOCYTES NFR BLD AUTO: 26.9 % (ref 10–50)
MAGNESIUM SERPL-MCNC: 1.73 MG/DL (ref 1.6–2.3)
MCH RBC QN: 28.8 PG (ref 26–34)
MCHC RBC-ENTMCNC: 31.9 GM/DL (ref 31.4–36)
MCV RBC: 90.4 FL (ref 80–98)
MONOCYTES # BLD AUTO: 1.22 X1000/UL (ref 0–0.9)
MONOCYTES NFR BLD AUTO: 11.8 % (ref 0–12)
NRBC BLD AUTO-RTO: 0 % (ref 0–0.2)
NRBC SPEC MANUAL: 0 X1000/UL
PLATELET # BLD: 244 X1000/MM3 (ref 150–450)
PMV BLD: 9.7 FL (ref 8–12)
POTASSIUM SERPL-SCNC: 4.7 MMOL/L (ref 3.5–5.1)
PROT SERPL-MCNC: 5.6 GM/DL (ref 6.3–8.6)
PROTHROMBIN TIME: 18.1 SECONDS (ref 11.1–15.3)
RBC # BLD: 4.58 MEGA/MM3 (ref 3.77–5.16)
SODIUM SERPL-SCNC: 138 MMOL/L (ref 137–145)
WBC # BLD: 10.4 X1000/UL (ref 3.2–9.8)

## 2017-01-14 PROCEDURE — 83735 ASSAY OF MAGNESIUM: CPT

## 2017-01-14 PROCEDURE — 9990

## 2017-01-14 PROCEDURE — 80053 COMPREHEN METABOLIC PANEL: CPT

## 2017-01-14 PROCEDURE — 85610 PROTHROMBIN TIME: CPT

## 2017-01-14 PROCEDURE — 97001: CPT

## 2017-01-14 PROCEDURE — 85025 COMPLETE CBC W/AUTO DIFF WBC: CPT

## 2017-01-14 PROCEDURE — 93225 XTRNL ECG REC<48 HRS REC: CPT

## 2017-01-14 PROCEDURE — 9990 CBC

## 2017-01-14 PROCEDURE — 9990 COMPREHENSIVE METABOLIC PANEL

## 2017-01-14 PROCEDURE — 97003: CPT

## 2017-01-15 LAB
ALBUMIN SERPL-MCNC: 2.7 GM/DL (ref 3.4–4.8)
ALP SERPL-CCNC: 84 U/L (ref 38–126)
ALT SERPL-CCNC: 17 U/L (ref 9–52)
ANION GAP SERPL CALCULATED.3IONS-SCNC: 8 MMOL/L (ref 5–15)
AST SERPL-CCNC: 16 U/L (ref 14–36)
BASOPHILS # BLD AUTO: 0.03 X1000/UL (ref 0–0.2)
BASOPHILS NFR BLD AUTO: 0.3 % (ref 0–2)
BILIRUB SERPL-MCNC: 0.5 MG/DL (ref 0.2–1.3)
BUN SERPL-MCNC: 13 MG/DL (ref 7–21)
CALCIUM SERPL-MCNC: 8.7 MG/DL (ref 8.4–10.2)
CHLORIDE SERPL-SCNC: 102 MMOL/L (ref 95–110)
CO2 SERPL-SCNC: 29 MMOL/L (ref 22–31)
CONV AUTO IG#: 0.03 X1000/UL (ref 0.01–0.02)
CONV AUTO IG%: 0.3 % (ref 0–0.5)
CREAT SERPL-MCNC: 0.9 MG/DL (ref 0.5–1)
EOSINOPHIL # BLD AUTO: 0.28 X1000/UL (ref 0–0.7)
EOSINOPHIL NFR BLD AUTO: 2.7 % (ref 0–7)
ERYTHROCYTE [DISTWIDTH] IN BLOOD: 15 % (ref 11.5–14.5)
GLUCOSE SERPL-MCNC: 77 MG/DL (ref 60–100)
GRANULOCYTES # BLD AUTO: 6.41 X1000/UL (ref 2–8.6)
GRANULOCYTES NFR BLD AUTO: 61.1 % (ref 37–80)
HCT VFR BLD CALC: 38.9 % (ref 35–45)
HGB BLD-MCNC: 12.5 GM/DL (ref 12–15.5)
INR PPP: 1.8 (ref 0.8–1.2)
LYMPHOCYTES # BLD AUTO: 2.78 X1000/UL (ref 0.6–4.2)
LYMPHOCYTES NFR BLD AUTO: 26.6 % (ref 10–50)
MAGNESIUM SERPL-MCNC: 1.91 MG/DL (ref 1.6–2.3)
MCH RBC QN: 28.5 PG (ref 26–34)
MCHC RBC-ENTMCNC: 32.1 GM/DL (ref 31.4–36)
MCV RBC: 88.8 FL (ref 80–98)
MONOCYTES # BLD AUTO: 0.94 X1000/UL (ref 0–0.9)
MONOCYTES NFR BLD AUTO: 9 % (ref 0–12)
PLATELET # BLD: 224 X1000/MM3 (ref 150–450)
PMV BLD: 10.4 FL (ref 8–12)
POTASSIUM SERPL-SCNC: 4.4 MMOL/L (ref 3.5–5.1)
PROT SERPL-MCNC: 5.4 GM/DL (ref 6.3–8.6)
PROTHROMBIN TIME: 20.9 SECONDS (ref 11.1–15.3)
RBC # BLD: 4.38 MEGA/MM3 (ref 3.77–5.16)
SODIUM SERPL-SCNC: 139 MMOL/L (ref 137–145)
WBC # BLD: 10.5 X1000/UL (ref 3.2–9.8)

## 2017-01-15 PROCEDURE — 93225 XTRNL ECG REC<48 HRS REC: CPT

## 2017-01-15 PROCEDURE — 85610 PROTHROMBIN TIME: CPT

## 2017-01-15 PROCEDURE — 85025 COMPLETE CBC W/AUTO DIFF WBC: CPT

## 2017-01-15 PROCEDURE — 83735 ASSAY OF MAGNESIUM: CPT

## 2017-01-15 PROCEDURE — 97110 THERAPEUTIC EXERCISES: CPT

## 2017-01-15 PROCEDURE — 9990

## 2017-01-15 PROCEDURE — 9990 CBC

## 2017-01-15 PROCEDURE — 9990 COMPREHENSIVE METABOLIC PANEL

## 2017-01-15 PROCEDURE — 80053 COMPREHEN METABOLIC PANEL: CPT

## 2017-01-16 ENCOUNTER — HOSPITAL ENCOUNTER (INPATIENT)
Dept: TELEMETRY | Facility: HOSPITAL | Age: 82
LOS: 5 days | Discharge: HOME | End: 2017-01-21
Attending: INTERNAL MEDICINE | Admitting: INTERNAL MEDICINE

## 2017-01-16 DIAGNOSIS — Z78.9 IMPAIRED MOBILITY AND ADLS: Primary | ICD-10-CM

## 2017-01-16 DIAGNOSIS — Z74.09 IMPAIRED MOBILITY AND ADLS: Primary | ICD-10-CM

## 2017-01-16 DIAGNOSIS — R07.9 CHEST PAIN, UNSPECIFIED TYPE: ICD-10-CM

## 2017-01-16 LAB
ALBUMIN SERPL-MCNC: 2.7 GM/DL (ref 3.4–4.8)
ALP SERPL-CCNC: 88 U/L (ref 38–126)
ALT SERPL-CCNC: 16 U/L (ref 9–52)
ANION GAP SERPL CALCULATED.3IONS-SCNC: 8 MMOL/L (ref 5–15)
AST SERPL-CCNC: 16 U/L (ref 14–36)
BASOPHILS # BLD AUTO: 0.03 X1000/UL (ref 0–0.2)
BASOPHILS NFR BLD AUTO: 0.2 % (ref 0–2)
BILIRUB SERPL-MCNC: 0.4 MG/DL (ref 0.2–1.3)
BUN SERPL-MCNC: 15 MG/DL (ref 7–21)
CALCIUM SERPL-MCNC: 8.6 MG/DL (ref 8.4–10.2)
CHLORIDE SERPL-SCNC: 103 MMOL/L (ref 95–110)
CO2 SERPL-SCNC: 29 MMOL/L (ref 22–31)
CONV AUTO IG#: 0.03 X1000/UL (ref 0.01–0.02)
CONV AUTO IG%: 0.2 % (ref 0–0.5)
CREAT SERPL-MCNC: 0.8 MG/DL (ref 0.5–1)
EOSINOPHIL # BLD AUTO: 0.19 X1000/UL (ref 0–0.7)
EOSINOPHIL NFR BLD AUTO: 1.5 % (ref 0–7)
ERYTHROCYTE [DISTWIDTH] IN BLOOD: 15.6 % (ref 11.5–14.5)
GLUCOSE SERPL-MCNC: 102 MG/DL (ref 60–100)
GRANULOCYTES # BLD AUTO: 8.83 X1000/UL (ref 2–8.6)
GRANULOCYTES NFR BLD AUTO: 71.7 % (ref 37–80)
HCT VFR BLD CALC: 38.7 % (ref 35–45)
HGB BLD-MCNC: 12.5 GM/DL (ref 12–15.5)
INR PPP: 2.5 (ref 0.8–1.2)
LYMPHOCYTES # BLD AUTO: 2.08 X1000/UL (ref 0.6–4.2)
LYMPHOCYTES NFR BLD AUTO: 16.9 % (ref 10–50)
MCH RBC QN: 28.7 PG (ref 26–34)
MCHC RBC-ENTMCNC: 32.3 GM/DL (ref 31.4–36)
MCV RBC: 89 FL (ref 80–98)
MONOCYTES # BLD AUTO: 1.17 X1000/UL (ref 0–0.9)
MONOCYTES NFR BLD AUTO: 9.5 % (ref 0–12)
NRBC BLD AUTO-RTO: 0 % (ref 0–0.2)
NRBC SPEC MANUAL: 0 X1000/UL
PLATELET # BLD: 248 X1000/MM3 (ref 150–450)
PMV BLD: 10 FL (ref 8–12)
POTASSIUM SERPL-SCNC: 4.4 MMOL/L (ref 3.5–5.1)
PROT SERPL-MCNC: 5.5 GM/DL (ref 6.3–8.6)
PROTHROMBIN TIME: 26.2 SECONDS (ref 11.1–15.3)
RBC # BLD: 4.35 MEGA/MM3 (ref 3.77–5.16)
SODIUM SERPL-SCNC: 140 MMOL/L (ref 137–145)
WBC # BLD: 12.3 X1000/UL (ref 3.2–9.8)

## 2017-01-16 PROCEDURE — 9990

## 2017-01-16 PROCEDURE — 85610 PROTHROMBIN TIME: CPT

## 2017-01-16 PROCEDURE — 9990 COMPREHENSIVE METABOLIC PANEL

## 2017-01-16 PROCEDURE — 9990 CBC

## 2017-01-16 PROCEDURE — 93225 XTRNL ECG REC<48 HRS REC: CPT

## 2017-01-16 PROCEDURE — 80053 COMPREHEN METABOLIC PANEL: CPT

## 2017-01-16 PROCEDURE — 85025 COMPLETE CBC W/AUTO DIFF WBC: CPT

## 2017-01-16 NOTE — IP AVS SNAPSHOT
AFTER VISIT SUMMARY             Shefali Kennedy           About your hospitalization     You were admitted on:  January 16, 2017 You last received care in the:  [address]       Procedures & Surgeries         Medications    If you or your caregiver advised us that you are currently taking a medication and that medication is marked below as “Resume”, this simply indicates that we have reviewed those medications to make sure our new therapy recommendations do not interfere.  If you have concerns about medications other than those new ones which we are prescribing today, please consult the physician who prescribed them (or your primary physician).  Our review of your home medications is not meant to indicate that we are directing their use.             Your Medications      START taking these medications     dofetilide 250 MCG capsule   Take 1 capsule by mouth Every 12 (Twelve) Hours.   Last time this was given:  1/21/2017  9:03 AM   Commonly known as:  TIKOSYN   Next Dose Due:  9:00 pm 1/21/2017           nitroglycerin 0.4 MG SL tablet   Place 1 tablet under the tongue Every 5 (Five) Minutes As Needed for chest pain. Take no more than 3 doses in 15 minutes.   Commonly known as:  NITROSTAT   Next Dose Due:  As needed   Notes to Patient:  If taking third dose call Emergency Medical Staff and report to Emergency Room.             CHANGE how you take these medications     lisinopril 5 MG tablet   Take 1 tablet by mouth Daily.   Last time this was given:  1/21/2017  9:03 AM   Commonly known as:  MARY AUGUST   What changed:    - medication strength  - how much to take  - when to take this   Next Dose Due:  9:00 am 1/22/2017             CONTINUE taking these medications     albuterol 108 (90 BASE) MCG/ACT inhaler   Inhale 2 puffs Every 6 (Six) Hours.   Commonly known as:  PROVENTIL HFA;VENTOLIN HFA   Next Dose Due:  As needed           bumetanide 1 MG tablet   Take 0.5 mg by mouth 2 (Two)  Times a Day.   Last time this was given:  1/21/2017  9:03 AM   Commonly known as:  BUMEX   Next Dose Due:  9:00 pm 1/21/2017           diltiaZEM  MG 24 hr capsule   Take 240 mg by mouth Daily.   Commonly known as:  CARDIZEM CD   Next Dose Due:  6:00 pm 1/21/2017           docusate sodium 100 MG capsule   Take 100 mg by mouth 2 (Two) Times a Day.   Commonly known as:  COLACE   Next Dose Due:  9:00 pm 1/21/2017           escitalopram 10 MG tablet   Take 1 tablet by mouth Daily.   Last time this was given:  1/21/2017  4:18 PM   Commonly known as:  LEXAPRO   Next Dose Due:  9:00 am 1/22/2017           gabapentin 100 MG capsule   Take 100 mg by mouth 3 (Three) Times a Day.   Last time this was given:  1/21/2017  1:14 PM   Commonly known as:  NEURONTIN   Next Dose Due:  9:00 pm 1/21/2017           levothyroxine 50 MCG tablet   Take 50 mcg by mouth Daily.   Last time this was given:  1/21/2017  6:19 AM   Commonly known as:  SYNTHROID, LEVOTHROID   Next Dose Due:  6:00 am 1/22/2017   Notes to Patient:  Take 1 hour before breakfast.           lidocaine 5 %   Place 1 patch on the skin Daily.   Last time this was given:  1/21/2017 11:07 AM   Commonly known as:  LIDODERM   Next Dose Due:  9:00 am 1/22/2017           linaclotide 290 MCG capsule capsule   Take 145 mcg by mouth Daily.   Last time this was given:  1/21/2017  9:30 AM   Commonly known as:  LINZESS   Next Dose Due:  9:00 am 1/22/2017           magnesium oxide 400 (241.3 MG) MG tablet tablet   Take 400 mg by mouth daily.   Last time this was given:  1/21/2017  9:03 AM   Commonly known as:  MAGOX   Next Dose Due:  9:00 am 1/22/2017           MIRALAX PO   Take 17 g by mouth Daily.   Next Dose Due:  9:00 am 1/22/2017           MULTIVITAL PO   Take 1 tablet by mouth Daily.   Last time this was given:  1/21/2017  9:10 AM   Next Dose Due:  9:00 am 1/22/2017           O2   2 L into each nostril Daily.   Commonly known as:  OXYGEN           omeprazole 20 MG capsule    Take 20 mg by mouth daily.   Commonly known as:  priLOSEC   Next Dose Due:  9:00 am 1/22/2017           oxyCODONE-acetaminophen 5-325 MG per tablet   Take 1 tablet by mouth Every 6 (Six) Hours As Needed.   Commonly known as:  PERCOCET   Next Dose Due:  As needed           potassium chloride 10 MEQ CR tablet   Take 10 mEq by mouth daily.   Last time this was given:  1/21/2017  9:03 AM   Commonly known as:  K-DUR,KLOR-CON   Next Dose Due:  9:00 am 1/22/2017           promethazine 25 MG tablet   Take 25 mg by mouth Every 4 (Four) Hours As Needed for nausea or vomiting.   Commonly known as:  PHENERGAN   Next Dose Due:  As needed           warfarin 1 MG tablet   Take 1 mg by mouth Take as directed.   Commonly known as:  COUMADIN   Next Dose Due:  9:00 pm 1/22/2017           zolpidem 10 MG tablet   Take 10 mg by mouth At Night As Needed for sleep.   Commonly known as:  AMBIEN   Next Dose Due:  As needed             STOP taking these medications     amiodarone 200 MG tablet   Commonly known as:  PACERONE           amLODIPine 5 MG tablet   Commonly known as:  NORVASC           ezetimibe 10 MG tablet   Commonly known as:  ZETIA           nebivolol 10 MG tablet   Commonly known as:  BYSTOLIC                Where to Get Your Medications      You can get these medications from any pharmacy     Bring a paper prescription for each of these medications     dofetilide 250 MCG capsule    escitalopram 10 MG tablet    lisinopril 5 MG tablet    nitroglycerin 0.4 MG SL tablet                  Your Medications      Your Medication List           Morning Noon Evening Bedtime As Needed    albuterol 108 (90 BASE) MCG/ACT inhaler   Inhale 2 puffs Every 6 (Six) Hours.   Commonly known as:  PROVENTIL HFA;VENTOLIN HFA                                   bumetanide 1 MG tablet   Take 0.5 mg by mouth 2 (Two) Times a Day.   Commonly known as:  BUMEX                                      diltiaZEM  MG 24 hr capsule   Take 240 mg by mouth  Daily.   Commonly known as:  CARDIZEM CD                                   docusate sodium 100 MG capsule   Take 100 mg by mouth 2 (Two) Times a Day.   Commonly known as:  COLACE                                      dofetilide 250 MCG capsule   Take 1 capsule by mouth Every 12 (Twelve) Hours.   Commonly known as:  TIKOSYN                                      escitalopram 10 MG tablet   Take 1 tablet by mouth Daily.   Commonly known as:  LEXAPRO                                   gabapentin 100 MG capsule   Take 100 mg by mouth 3 (Three) Times a Day.   Commonly known as:  NEURONTIN                                         levothyroxine 50 MCG tablet   Take 50 mcg by mouth Daily.   Commonly known as:  SYNTHROID, LEVOTHROID   Notes to Patient:  Take 1 hour before breakfast.                                   lidocaine 5 %   Place 1 patch on the skin Daily.   Commonly known as:  LIDODERM                                   linaclotide 290 MCG capsule capsule   Take 145 mcg by mouth Daily.   Commonly known as:  LINZESS                                   lisinopril 5 MG tablet   Take 1 tablet by mouth Daily.   Commonly known as:  PRINIVIL,ZESTRIL                                   magnesium oxide 400 (241.3 MG) MG tablet tablet   Take 400 mg by mouth daily.   Commonly known as:  MAGOX                                   MIRALAX PO   Take 17 g by mouth Daily.                                   MULTIVITAL PO   Take 1 tablet by mouth Daily.                                   nitroglycerin 0.4 MG SL tablet   Place 1 tablet under the tongue Every 5 (Five) Minutes As Needed for chest pain. Take no more than 3 doses in 15 minutes.   Commonly known as:  NITROSTAT   Notes to Patient:  If taking third dose call Emergency Medical Staff and report to Emergency Room.                                   O2   2 L into each nostril Daily.   Commonly known as:  OXYGEN                                omeprazole 20 MG capsule   Take 20 mg by mouth daily.    Commonly known as:  priLOSEC                                   oxyCODONE-acetaminophen 5-325 MG per tablet   Take 1 tablet by mouth Every 6 (Six) Hours As Needed.   Commonly known as:  PERCOCET                                   potassium chloride 10 MEQ CR tablet   Take 10 mEq by mouth daily.   Commonly known as:  K-DUR,KLOR-CON                                   promethazine 25 MG tablet   Take 25 mg by mouth Every 4 (Four) Hours As Needed for nausea or vomiting.   Commonly known as:  PHENERGAN                                   warfarin 1 MG tablet   Take 1 mg by mouth Take as directed.   Commonly known as:  COUMADIN                                   zolpidem 10 MG tablet   Take 10 mg by mouth At Night As Needed for sleep.   Commonly known as:  AMBIEN                            As needed at bedtime                Instructions for After Discharge        Activity Instructions     Activity as tolerated.           Discharge References/Attachments     DOFETILIDE CAPSULES (ENGLISH)    NITROGLYCERIN SUBLINGUAL TABLETS (ENGLISH)    LISINOPRIL TABLETS (ENGLISH)    ESCITALOPRAM TABLETS (ENGLISH)    WARFARIN: WHAT YOU NEED TO KNOW (ENGLISH)    ATRIAL FIBRILLATION, EASY-TO-READ (ENGLISH)       Follow-ups for After Discharge        Follow-up Information     Follow up with [facility] .    Specialty:  Home Health Services    Contact information:    [address] [place] [id number] [phone]      Referrals and Follow-ups to Schedule       Follow up with Primary Care Provider (Dr. Lozano) in 1 week.           Scheduled Appointments     Feb 09, 2017  9:30 AM [facility]   Office Visit with True Summers MD   [facility] CARDIOLOGY (--)    [address]  [facility] [id number] [phone]           Arrive 15 minutes prior to appointment.            Feb 20, 2017 10:00 AM [facility]   Hospital Follow Up with True Summers MD   [facility] CARDIOLOGY (--)     [address]  [facility] [address] [phone]              [facility] Signup     Our records indicate that your [employer] account has been deactivated. If you would like to reactivate your account, please email [email or web address] or call [phone] to talk to our [employer] staff.         Summary of Your Hospitalization        Reason for Hospitalization     Your primary diagnosis was:  Atrial Fibrillation (Irregular Heartbeat)    Your diagnoses also included:  Chest Pain, High Cholesterol Or Triglycerides, Severe Uncontrolled High Blood Pressure, Hypothyroidism, Adult, Parkinson Disease, Heart Valve Replaced      Care Providers     Provider Service Role Specialty    Yaniv Marroquin MD -- Attending Provider Internal Medicine      Your Allergies  Date Reviewed: 1/20/2017    Allergen Reactions    Amoxicillin-Pot Clavulanate Not Noted         Aspirin Not Noted    Cant take because shes on blood thinner         Latex Not Noted         Penicillins Not Noted         Simvastatin Not Noted         Bacitracin Rash      Patient Belongings Returned     Document Return of Belongings Flowsheet     Were the patient bedside belongings sent home?   Yes   Belongings Retrieved from Security & Sent Home   N/A    Belongings Sent to Safe   --   Medications Retrieved from Pharmacy & Sent Home   N/A              More Information      Dofetilide capsules  What is this medicine?  DOFETILIDE (gillepsie FET il justin) is an antiarrhythmic drug. It helps make your heart beat regularly. This medicine also helps to slow rapid heartbeats.  This medicine may be used for other purposes; ask your health care provider or pharmacist if you have questions.  What should I tell my health care provider before I take this medicine?  They need to know if you have any of these conditions:  -heart disease  -history of low levels of potassium or magnesium  -kidney disease  -liver disease  -an unusual or allergic reaction to  dofetilide, other medicines, foods, dyes, or preservatives  -pregnant or trying to get pregnant  -breast-feeding  How should I use this medicine?  Take this medicine by mouth with a glass of water. Follow the directions on the prescription label. You can take this medicine with or without food. Do not drink grapefruit juice with this medicine. Take your doses at regular intervals. Do not take your medicine more often than directed. Do not stop taking this medicine suddenly. This may cause serious, heart-related side effects. Your doctor will tell you how much medicine to take. If your doctor wants you to stop the medicine, the dose will be slowly lowered over time to avoid any side effects.  Talk to your pediatrician regarding the use of this medicine in children. Special care may be needed.  Overdosage: If you think you have taken too much of this medicine contact a poison control center or emergency room at once.  NOTE: This medicine is only for you. Do not share this medicine with others.  What if I miss a dose?  If you miss a dose, take it as soon as you can. If it is almost time for your next dose, take only that dose. Do not take double or extra doses.  What may interact with this medicine?  Do not take this medicine with any of the following medications:  -cimetidine  -dolutegravir  -hydrochlorothiazide alone or in combination with other medicines  -ketoconazole  -megestrol  -other medicines that prolong the QT interval (cause an abnormal heart rhythm)  -prochlorperazine  -trimethoprim alone or in combination with sulfamethoxazole  -verapamil  This medicine may also interact with the following medications:  -amiloride  -certain antidepressants like fluvoxamine or paroxetine  -certain antiviral medicines for HIV or AIDS like atazanavir or darunavir  -certain medicines for fungal infections like clotrimazole or miconazole  -digoxin  -diltiazem  -dronabinol, THC  -grapefruit  juice  -metformin  -nefazodone  -triamterene  -zafirlukast  This list may not describe all possible interactions. Give your health care provider a list of all the medicines, herbs, non-prescription drugs, or dietary supplements you use. Also tell them if you smoke, drink alcohol, or use illegal drugs. Some items may interact with your medicine.  What should I watch for while using this medicine?  Visit your doctor or health care professional for regular checks on your progress. Wear a medical ID bracelet or chain, and carry a card that describes your disease and details of your medicine and dosage times.  Check your heart rate and blood pressure regularly while you are taking this medicine. Ask your doctor or health care professional what your heart rate and blood pressure should be, and when you should contact him or her. Your doctor or health care professional also may schedule regular tests to check your progress.  You will be started on this medicine in a specialized facility for at least three days. You will be monitored to find the right dose of medicine for you. It is very important that you take your medicine exactly as prescribed when you leave the hospital. The correct dosing of this medicine is very important to treat your condition and prevent possible serious side effects.  What side effects may I notice from receiving this medicine?  Side effects that you should report to your doctor or health care professional as soon as possible:  -allergic reactions like skin rash, itching or hives, swelling of the face, lips, or tongue  -breathing problems  -dizziness  -fast or rapid beating of the heart  -feeling faint or lightheaded  -swelling of the ankles  -unusually weak or tired  -vomiting  Side effects that usually do not require medical attention (report to your doctor or health care professional if they continue or are bothersome):  -cough  -diarrhea  -difficulty sleeping  -headache  -nausea  -stomach  pain  This list may not describe all possible side effects. Call your doctor for medical advice about side effects. You may report side effects to FDA at 4-719-IKG-6491.  Where should I keep my medicine?  Keep out of the reach of children.  Store at room temperature between 15 and 30 degrees C (59 and 86 degrees F). Protect the medicine from moisture or humidity. Keep container tightly closed. Throw away any unused medicine after the expiration date.  NOTE: This sheet is a summary. It may not cover all possible information. If you have questions about this medicine, talk to your doctor, pharmacist, or health care provider.     © 2016, Elsevier/Gold Standard. (2015-08-10 16:21:18)          Nitroglycerin sublingual tablets  What is this medicine?  NITROGLYCERIN (deborah troe GLI ser in) is a type of vasodilator. It relaxes blood vessels, increasing the blood and oxygen supply to your heart. This medicine is used to relieve chest pain caused by angina. It is also used to prevent chest pain before activities like climbing stairs, going outdoors in cold weather, or sexual activity.  This medicine may be used for other purposes; ask your health care provider or pharmacist if you have questions.  What should I tell my health care provider before I take this medicine?  They need to know if you have any of these conditions:  -anemia  -head injury, recent stroke, or bleeding in the brain  -liver disease  -previous heart attack  -an unusual or allergic reaction to nitroglycerin, other medicines, foods, dyes, or preservatives  -pregnant or trying to get pregnant  -breast-feeding  How should I use this medicine?  Take this medicine by mouth as needed. At the first sign of an angina attack (chest pain or tightness) place one tablet under your tongue. You can also take this medicine 5 to 10 minutes before an event likely to produce chest pain. Follow the directions on the prescription label. Let the tablet dissolve under the tongue.  Do not swallow whole. Replace the dose if you accidentally swallow it. It will help if your mouth is not dry. Saliva around the tablet will help it to dissolve more quickly. Do not eat or drink, smoke or chew tobacco while a tablet is dissolving. If you are not better within 5 minutes after taking ONE dose of nitroglycerin, call 9-1-1 immediately to seek emergency medical care. Do not take more than 3 nitroglycerin tablets over 15 minutes.  If you take this medicine often to relieve symptoms of angina, your doctor or health care professional may provide you with different instructions to manage your symptoms. If symptoms do not go away after following these instructions, it is important to call 9-1-1 immediately. Do not take more than 3 nitroglycerin tablets over 15 minutes.  Talk to your pediatrician regarding the use of this medicine in children. Special care may be needed.  Overdosage: If you think you have taken too much of this medicine contact a poison control center or emergency room at once.  NOTE: This medicine is only for you. Do not share this medicine with others.  What if I miss a dose?  This does not apply. This medicine is only used as needed.  What may interact with this medicine?  Do not take this medicine with any of the following medications:  -certain migraine medicines like ergotamine and dihydroergotamine (DHE)  -medicines used to treat erectile dysfunction like sildenafil, tadalafil, and vardenafil  -riociguat  This medicine may also interact with the following medications:  -alteplase  -aspirin  -heparin  -medicines for high blood pressure  -medicines for mental depression  -other medicines used to treat angina  -phenothiazines like chlorpromazine, mesoridazine, prochlorperazine, thioridazine  This list may not describe all possible interactions. Give your health care provider a list of all the medicines, herbs, non-prescription drugs, or dietary supplements you use. Also tell them if you  smoke, drink alcohol, or use illegal drugs. Some items may interact with your medicine.  What should I watch for while using this medicine?  Tell your doctor or health care professional if you feel your medicine is no longer working.  Keep this medicine with you at all times. Sit or lie down when you take your medicine to prevent falling if you feel dizzy or faint after using it. Try to remain calm. This will help you to feel better faster. If you feel dizzy, take several deep breaths and lie down with your feet propped up, or bend forward with your head resting between your knees.  You may get drowsy or dizzy. Do not drive, use machinery, or do anything that needs mental alertness until you know how this drug affects you. Do not stand or sit up quickly, especially if you are an older patient. This reduces the risk of dizzy or fainting spells. Alcohol can make you more drowsy and dizzy. Avoid alcoholic drinks.  Do not treat yourself for coughs, colds, or pain while you are taking this medicine without asking your doctor or health care professional for advice. Some ingredients may increase your blood pressure.  What side effects may I notice from receiving this medicine?  Side effects that you should report to your doctor or health care professional as soon as possible:  -blurred vision  -dry mouth  -skin rash  -sweating  -the feeling of extreme pressure in the head  -unusually weak or tired  Side effects that usually do not require medical attention (report to your doctor or health care professional if they continue or are bothersome):  -flushing of the face or neck  -headache  -irregular heartbeat, palpitations  -nausea, vomiting  This list may not describe all possible side effects. Call your doctor for medical advice about side effects. You may report side effects to FDA at 6-761-FDA-6917.  Where should I keep my medicine?  Keep out of the reach of children.  Store at room temperature between 20 and 25 degrees C  (68 and 77 degrees F). Store in original container. Protect from light and moisture. Keep tightly closed. Throw away any unused medicine after the expiration date.  NOTE: This sheet is a summary. It may not cover all possible information. If you have questions about this medicine, talk to your doctor, pharmacist, or health care provider.     © 2016, Elsevier/Gold Standard. (2014-10-16 17:57:36)          Lisinopril tablets  What is this medicine?  LISINOPRIL (lyse IN oh pril) is an ACE inhibitor. This medicine is used to treat high blood pressure and heart failure. It is also used to protect the heart immediately after a heart attack.  This medicine may be used for other purposes; ask your health care provider or pharmacist if you have questions.  What should I tell my health care provider before I take this medicine?  They need to know if you have any of these conditions:  -diabetes  -heart or blood vessel disease  -kidney disease  -low blood pressure  -previous swelling of the tongue, face, or lips with difficulty breathing, difficulty swallowing, hoarseness, or tightening of the throat  -an unusual or allergic reaction to lisinopril, other ACE inhibitors, insect venom, foods, dyes, or preservatives  -pregnant or trying to get pregnant  -breast-feeding  How should I use this medicine?  Take this medicine by mouth with a glass of water. Follow the directions on your prescription label. You may take this medicine with or without food. If it upsets your stomach, take it with food. Take your medicine at regular intervals. Do not take it more often than directed. Do not stop taking except on your doctor's advice.  Talk to your pediatrician regarding the use of this medicine in children. Special care may be needed. While this drug may be prescribed for children as young as 6 years of age for selected conditions, precautions do apply.  Overdosage: If you think you have taken too much of this medicine contact a poison  control center or emergency room at once.  NOTE: This medicine is only for you. Do not share this medicine with others.  What if I miss a dose?  If you miss a dose, take it as soon as you can. If it is almost time for your next dose, take only that dose. Do not take double or extra doses.  What may interact with this medicine?  Do not take this medicine with any of the following medications:  -hymenoptera venomThis medicines may also interact with the following medications:  -aliskiren  -angiotensin receptor blockers, like losartan or valsartan  -certain medicines for diabetes  -diuretics  -everolimus  -gold compounds  -lithium  -NSAIDs, medicines for pain and inflammation, like ibuprofen or naproxen  -potassium salts or supplements  -salt substitutes  -sirolimus  -temsirolimus  This list may not describe all possible interactions. Give your health care provider a list of all the medicines, herbs, non-prescription drugs, or dietary supplements you use. Also tell them if you smoke, drink alcohol, or use illegal drugs. Some items may interact with your medicine.  What should I watch for while using this medicine?  Visit your doctor or health care professional for regular check ups. Check your blood pressure as directed. Ask your doctor what your blood pressure should be, and when you should contact him or her.  Do not treat yourself for coughs, colds, or pain while you are using this medicine without asking your doctor or health care professional for advice. Some ingredients may increase your blood pressure.  Women should inform their doctor if they wish to become pregnant or think they might be pregnant. There is a potential for serious side effects to an unborn child. Talk to your health care professional or pharmacist for more information.  Check with your doctor or health care professional if you get an attack of severe diarrhea, nausea and vomiting, or if you sweat a lot. The loss of too much body fluid can make  it dangerous for you to take this medicine.  You may get drowsy or dizzy. Do not drive, use machinery, or do anything that needs mental alertness until you know how this drug affects you. Do not stand or sit up quickly, especially if you are an older patient. This reduces the risk of dizzy or fainting spells. Alcohol can make you more drowsy and dizzy. Avoid alcoholic drinks.  Avoid salt substitutes unless you are told otherwise by your doctor or health care professional.  What side effects may I notice from receiving this medicine?  Side effects that you should report to your doctor or health care professional as soon as possible:  -allergic reactions like skin rash, itching or hives, swelling of the hands, feet, face, lips, throat, or tongue  -breathing problems  -signs and symptoms of kidney injury like trouble passing urine or change in the amount of urine  -signs and symptoms of increased potassium like muscle weakness; chest pain; or fast, irregular heartbeat  -signs and symptoms of liver injury like dark yellow or brown urine; general ill feeling or flu-like symptoms; light-colored stools; loss of appetite; nausea; right upper belly pain; unusually weak or tired; yellowing of the eyes or skin  -signs and symptoms of low blood pressure like dizziness; feeling faint or lightheaded, falls; unusually weak or tired  -stomach pain with or without nausea and vomiting  Side effects that usually do not require medical attention (report to your doctor or health care professional if they continue or are bothersome):  -changes in taste  -cough  -dizziness  -fever  -headache  -sensitivity to light  This list may not describe all possible side effects. Call your doctor for medical advice about side effects. You may report side effects to FDA at 9-174-FDA-7662.  Where should I keep my medicine?  Keep out of the reach of children.  Store at room temperature between 15 and 30 degrees C (59 and 86 degrees F). Protect from  moisture. Keep container tightly closed. Throw away any unused medicine after the expiration date.  NOTE: This sheet is a summary. It may not cover all possible information. If you have questions about this medicine, talk to your doctor, pharmacist, or health care provider.     © 2016, [employer]. (2016-08-11 20:38:20)          Escitalopram tablets  What is this medicine?  ESCITALOPRAM (es sye ALL oh pram) is used to treat depression and certain types of anxiety.  This medicine may be used for other purposes; ask your health care provider or pharmacist if you have questions.  What should I tell my health care provider before I take this medicine?  They need to know if you have any of these conditions:  -bipolar disorder or a family history of bipolar disorder  -diabetes  -glaucoma  -heart disease  -kidney or liver disease  -receiving electroconvulsive therapy  -seizures (convulsions)  -suicidal thoughts, plans, or attempt by you or a family member  -an unusual or allergic reaction to escitalopram, the related drug citalopram, other medicines, foods, dyes, or preservatives  -pregnant or trying to become pregnant  -breast-feeding  How should I use this medicine?  Take this medicine by mouth with a glass of water. Follow the directions on the prescription label. You can take it with or without food. If it upsets your stomach, take it with food. Take your medicine at regular intervals. Do not take it more often than directed. Do not stop taking this medicine suddenly except upon the advice of your doctor. Stopping this medicine too quickly may cause serious side effects or your condition may worsen.  A special MedGuide will be given to you by the pharmacist with each prescription and refill. Be sure to read this information carefully each time.  Talk to your pediatrician regarding the use of this medicine in children. Special care may be needed.  Overdosage: If you think you have taken too much of this  medicine contact a poison control center or emergency room at once.  NOTE: This medicine is only for you. Do not share this medicine with others.  What if I miss a dose?  If you miss a dose, take it as soon as you can. If it is almost time for your next dose, take only that dose. Do not take double or extra doses.  What may interact with this medicine?  Do not take this medicine with any of the following medications:  -certain medicines for fungal infections like fluconazole, itraconazole, ketoconazole, posaconazole, voriconazole  -cisapride  -citalopram  -dofetilide  -dronedarone  -linezolid  -MAOIs like Carbex, Eldepryl, Marplan, Nardil, and Parnate  -methylene blue (injected into a vein)  -pimozide  -thioridazine  -ziprasidone  This medicine may also interact with the following medications:  -alcohol  -aspirin and aspirin-like medicines  -carbamazepine  -certain medicines for depression, anxiety, or psychotic disturbances  -certain medicines for migraine headache like almotriptan, eletriptan, frovatriptan, naratriptan, rizatriptan, sumatriptan, zolmitriptan  -certain medicines for sleep  -certain medicines that treat or prevent blood clots like warfarin, enoxaparin, dalteparin  -cimetidine  -diuretics  -fentanyl  -furazolidone  -isoniazid  -lithium  -metoprolol  -NSAIDs, medicines for pain and inflammation, like ibuprofen or naproxen  -other medicines that prolong the QT interval (cause an abnormal heart rhythm)  -procarbazine  -rasagiline  -supplements like Bong's wort, kava kava, valerian  -tramadol  -tryptophan  This list may not describe all possible interactions. Give your health care provider a list of all the medicines, herbs, non-prescription drugs, or dietary supplements you use. Also tell them if you smoke, drink alcohol, or use illegal drugs. Some items may interact with your medicine.  What should I watch for while using this medicine?  Tell your doctor if your symptoms do not get better or if  they get worse. Visit your doctor or health care professional for regular checks on your progress. Because it may take several weeks to see the full effects of this medicine, it is important to continue your treatment as prescribed by your doctor.  Patients and their families should watch out for new or worsening thoughts of suicide or depression. Also watch out for sudden changes in feelings such as feeling anxious, agitated, panicky, irritable, hostile, aggressive, impulsive, severely restless, overly excited and hyperactive, or not being able to sleep. If this happens, especially at the beginning of treatment or after a change in dose, call your health care professional.  You may get drowsy or dizzy. Do not drive, use machinery, or do anything that needs mental alertness until you know how this medicine affects you. Do not stand or sit up quickly, especially if you are an older patient. This reduces the risk of dizzy or fainting spells. Alcohol may interfere with the effect of this medicine. Avoid alcoholic drinks.  Your mouth may get dry. Chewing sugarless gum or sucking hard candy, and drinking plenty of water may help. Contact your doctor if the problem does not go away or is severe.  What side effects may I notice from receiving this medicine?  Side effects that you should report to your doctor or health care professional as soon as possible:  -allergic reactions like skin rash, itching or hives, swelling of the face, lips, or tongue  -confusion  -feeling faint or lightheaded, falls  -fast talking and excited feelings or actions that are out of control  -hallucination, loss of contact with reality  -seizures  -suicidal thoughts or other mood changes  -unusual bleeding or bruising  Side effects that usually do not require medical attention (report to your doctor or health care professional if they continue or are bothersome):  -blurred vision  -changes in appetite  -change in sex drive or  "performance  -headache  -increased sweating  -nausea  This list may not describe all possible side effects. Call your doctor for medical advice about side effects. You may report side effects to FDA at 8-987-WQL-2986.  Where should I keep my medicine?  Keep out of reach of children.  Store at room temperature between 15 and 30 degrees C (59 and 86 degrees F). Throw away any unused medicine after the expiration date.  NOTE: This sheet is a summary. It may not cover all possible information. If you have questions about this medicine, talk to your doctor, pharmacist, or health care provider.     © 2016, [employer]. (2014-07-15 12:32:55)          Warfarin: What You Need to Know  Warfarin is an anticoagulant. Anticoagulants help prevent the formation of blood clots. They also help stop the growth of blood clots. Warfarin is sometimes referred to as a \"blood thinner.\"   Normally, when body tissues are cut or damaged, the blood clots in order to prevent blood loss. Sometimes clots form inside your blood vessels and obstruct the flow of blood through your circulatory system (thrombosis). These clots may travel through your bloodstream and become lodged in smaller blood vessels in your brain, which can cause a stroke, or in your lungs (pulmonary embolism).  WHO SHOULD USE WARFARIN?  Warfarin is prescribed for people at risk of developing harmful blood clots:  · People with surgically implanted mechanical heart valves, irregular heart rhythms called atrial fibrillation, and certain clotting disorders.  · People who have developed harmful blood clotting in the past, including those who have had a stroke or a pulmonary embolism, or thrombosis in their legs (deep vein thrombosis [DVT]).  · People with an existing blood clot, such as a pulmonary embolism.  WARFARIN DOSING  Warfarin tablets come in different strengths. Each tablet strength is a different color, with the amount of warfarin (in milligrams) clearly " "printed on the tablet. If the color of your tablet is different than usual when you receive a new prescription, report it immediately to your pharmacist or health care provider.  WARFARIN MONITORING  The goal of warfarin therapy is to lessen the clotting tendency of blood but not prevent clotting completely. Your health care provider will monitor the anticoagulation effect of warfarin closely and adjust your dose as needed. For your safety, blood tests called prothrombin time (PT) or international normalized ratio (INR) are used to measure the effects of warfarin. Both of these tests can be done with a finger stick or a blood draw.  The longer it takes the blood to clot, the higher the PT or INR. Your health care provider will inform you of your \"target\" PT or INR range. If, at any time, your PT or INR is above the target range, there is a risk of bleeding. If your PT or INR is below the target range, there is a risk of clotting.  Whether you are started on warfarin while you are in the hospital or in your health care provider's office, you will need to have your PT or INR checked within one week of starting the medicine. Initially, some people are asked to have their PT or INR checked as much as twice a week.  Once you are on a stable maintenance dose, the PT or INR is checked less often, usually once every 2 to 4 weeks. The warfarin dose may be adjusted if the PT or INR is not within the target range. It is important to keep all laboratory and health care provider follow-up appointments. Not keeping appointments could result in a chronic or permanent injury, pain, or disability because warfarin is a medicine that requires close monitoring.  WHAT ARE THE SIDE EFFECTS OF WARFARIN?  · Too much warfarin can cause bleeding (hemorrhage) from any part of the body. This may include bleeding from the gums, blood in the urine, bloody or dark stools, a nosebleed that is not easily stopped, coughing up blood, or vomiting " blood.  · Too little warfarin can increase the risk of blood clots.  · Too little or too much warfarin can also increase the risk of a stroke.  · Warfarin use may cause a skin rash or irritation, an unusual fever, continual nausea or stomach upset, or severe pain in your joints or back.  SPECIAL PRECAUTIONS WHILE TAKING WARFARIN  Warfarin should be taken exactly as directed. It is very important to take warfarin as directed since bleeding or blood clots could result in chronic or permanent injury, pain, or disability.  · Take your medicine at the same time every day. If you forget to take your dose, you can take it if it is within 6 hours of when it was due.  · Do not change the dose of warfarin on your own to make up for missed or extra doses.  · If you miss more than 2 doses in a row, you should contact your health care provider for advice.  Avoid situations that cause bleeding. You may have a tendency to bleed more easily than usual while taking warfarin. The following actions can limit bleeding:  · Using a softer toothbrush.  · Flossing with waxed floss rather than unwaxed floss.  · Shaving with an electric razor rather than a blade.  · Limiting the use of sharp objects.  · Avoiding potentially harmful activities, such as contact sports.  Warfarin and Pregnancy or Breastfeeding  · Warfarin is not advised during the first trimester of pregnancy due to an increased risk of birth defects. In certain situations, a woman may take warfarin after her first trimester of pregnancy. A woman who becomes pregnant or plans to become pregnant while taking warfarin should notify her health care provider immediately.  · Although warfarin does not pass into breast milk, a woman who wishes to breastfeed while taking warfarin should also consult with her health care provider.  Alcohol, Smoking, and Illicit Drug Use  · Alcohol affects how warfarin works in the body. It is best to avoid alcoholic drinks or consume very small amounts  while taking warfarin. In general, alcohol intake should be limited to 1 oz (30 mL) of liquor, 6 oz (180 mL) of wine, or 12 oz (360 mL) of beer each day. Notify your health care provider if you change your alcohol intake.  · Smoking affects how warfarin works. It is best to avoid smoking while taking warfarin. Notify your health care provider if you change your smoking habits.  · It is best to avoid all illicit drugs while taking warfarin since there are few studies that show how warfarin interacts with these drugs.  Other Medicines and Dietary Supplements  Many prescription and over-the-counter medicines can interfere with warfarin. Be sure all of your health care providers know you are taking warfarin. Notify your health care provider who prescribed warfarin for you or your pharmacist before starting or stopping any new medicines, including over-the-counter vitamins, dietary supplements, and pain medicines. Your warfarin dose may need to be adjusted.  Some common over-the-counter medicines that may increase the risk of bleeding while taking warfarin include:   · Acetaminophen.  · Aspirin.  · Nonsteroidal anti-inflammatory medicines (NSAIDs), such as ibuprofen or naproxen.  · Vitamin E.  Dietary Considerations   Foods that have moderate or high amounts of vitamin K can interfere with warfarin. Avoid major changes in your diet or notify your health care provider before changing your diet. Eat a consistent amount of foods that have moderate or high amounts of vitamin K. Eating less foods containing vitamin K can increase the risk of bleeding. Eating more foods containing vitamin K can increase the risk of blood clots. Additional questions about dietary considerations can be discussed with a dietitian.  Foods that are very high in vitamin K:  · Greens, such as Swiss chard and beet, vasiliy, mustard, or turnip greens (fresh or frozen, cooked).  · Kale (fresh or frozen, cooked).  · Parsley (raw).  · Spinach  "(cooked).  Foods that are high in vitamin K:  · Asparagus (frozen, cooked).  · Broccoli.  · Bok elian (cooked).  · Miller sprouts (fresh or frozen, cooked).  · Cabbage (cooked).  ·  Coleslaw.  Foods that are moderately high in vitamin K:  · Blueberries.  · Black-eyed peas.  · Endive (raw).  · Green leaf lettuce (raw).  · Green scallions (raw).  · Kale (raw).  · Okra (frozen, cooked).  · Plantains (fried).  · Zeeshan lettuce (raw).  · Sauerkraut (canned).  · Spinach (raw).  CALL YOUR CLINIC OR HEALTH CARE PROVIDER IF YOU:  · Plan to have any surgery or procedure.  · Feel sick, especially if you have diarrhea or vomiting.  · Experience or anticipate any major changes in your diet.  · Start or stop a prescription or over-the-counter medicine.  · Become, plan to become, or think you may be pregnant.  · Are having heavier than usual menstrual periods.  · Have had a fall, accident, or any symptoms of bleeding or unusual bruising.  · Develop an unusual fever.  CALL 911 IN THE [place] OR GO TO THE EMERGENCY DEPARTMENT IF YOU:   · Think you may be having an allergic reaction to warfarin. The signs of an allergic reaction could include itching, rash, hives, swelling, chest tightness, or trouble breathing.  · See signs of blood in your urine. The signs could include reddish, pinkish, or tea-colored urine.  · See signs of blood in your stools. The signs could include bright red or black stools.  · Vomit or cough up blood. In these instances, the blood could have either a bright red or a \"coffee-grounds\" appearance.  · Have bleeding that will not stop after applying pressure for 30 minutes such as cuts, nosebleeds, or other injuries.  · Have severe pain in your joints or back.  · Have a new and severe headache.  · Have sudden weakness or numbness of your face, arm, or leg, especially on one side of your body.  · Have sudden confusion or trouble understanding.  · Have sudden trouble seeing in one or both eyes.  · Have sudden " trouble walking, dizziness, loss of balance, or coordination.  · Have trouble speaking or understanding (aphasia).     This information is not intended to replace advice given to you by your health care provider. Make sure you discuss any questions you have with your health care provider.     Document Released: 12/18/2006 Document Revised: 01/08/2016 Document Reviewed: 06/13/2014  [employer] Interactive Patient Education ©2016 [employer].          Atrial Fibrillation  Atrial fibrillation is a type of heartbeat that is irregular or fast (rapid). If you have this condition, your heart keeps quivering in a weird (chaotic) way. This condition can make it so your heart cannot pump blood normally. Having this condition gives a person more risk for stroke, heart failure, and other heart problems. There are different types of atrial fibrillation. Talk with your doctor to learn about the type that you have.  HOME CARE  · Take over-the-counter and prescription medicines only as told by your doctor.  · If your doctor prescribed a blood-thinning medicine, take it exactly as told. Taking too much of it can cause bleeding. If you do not take enough of it, you will not have the protection that you need against stroke and other problems.  · Do not use any tobacco products. These include cigarettes, chewing tobacco, and e-cigarettes. If you need help quitting, ask your doctor.  · If you have apnea (obstructive sleep apnea), manage it as told by your doctor.  · Do not drink alcohol.  · Do not drink beverages that have caffeine. These include coffee, soda, and tea.  · Maintain a healthy weight. Do not use diet pills unless your doctor says they are safe for you. Diet pills may make heart problems worse.  · Follow diet instructions as told by your doctor.  · Exercise regularly as told by your doctor.  · Keep all follow-up visits as told by your doctor. This is important.  GET HELP IF:  · You notice a change in the speed, rhythm, or  strength of your heartbeat.  · You are taking a blood-thinning medicine and you notice more bruising.  · You get tired more easily when you move or exercise.  GET HELP RIGHT AWAY IF:  · You have pain in your chest or your belly (abdomen).  · You have sweating or weakness.  · You feel sick to your stomach (nauseous).  · You notice blood in your throw up (vomit), poop (stool), or pee (urine).  · You are short of breath.  · You suddenly have swollen feet and ankles.  · You feel dizzy.  · Your suddenly get weak or numb in your face, arms, or legs, especially if it happens on one side of your body.  · You have trouble talking, trouble understanding, or both.  · Your face or your eyelid droops on one side.  These symptoms may be an emergency. Do not wait to see if the symptoms will go away. Get medical help right away. Call your local emergency services (911 in the [place] Do not drive yourself to the hospital.     This information is not intended to replace advice given to you by your health care provider. Make sure you discuss any questions you have with your health care provider.     Document Released: 09/26/2009 Document Revised: 09/07/2016 Document Reviewed: 04/13/2016  [employer] Interactive Patient Education ©2016 [employer]            SYMPTOMS OF A STROKE    Call 911 or have someone take you to the Emergency Department if you have any of the following:    · Sudden numbness or weakness of your face, arm or leg especially on one side of the body  · Sudden confusion, diffiiculty speaking or trouble understanding   · Changes in your vision or loss of sight in one eye  · Sudden severe headache with no known cause  · sudden dizziness, trouble walking, loss of balance or coordination    It is important to seek emergency care right away if you have further stroke symptoms. If you get emergency help quickly, the powerful clot-dissolving medicines can reduce the disabilities caused by a stroke.     For more  information:    American Stroke Association  5-401-3-STROKE  www.strokeassociation.org           IF YOU SMOKE OR USE TOBACCO PLEASE READ THE FOLLOWING:    Why is smoking bad for me?  Smoking increases the risk of heart disease, lung disease, vascular disease, stroke, and cancer.     If you smoke, STOP!    If you would like more information on quitting smoking, please visit the [employer] website: [email or web address]   This link will provide additional resources including the QUIT line and the [employer] support groups.     For more information:    American Cancer Society  ([phone]    American Heart Association  1-904.619.3172               YOU ARE THE MOST IMPORTANT FACTOR IN YOUR RECOVERY.     Follow all instructions carefully.     I have reviewed my discharge instructions with my nurse, including the following information, if applicable:     Information about my illness and diagnosis   Follow up appointments (including lab draws)   Wound Care   Equipment Needs   Medications (new and continuing) along with side effects   Preventative information such as vaccines and smoking cessations   Diet   Pain   I know when to contact my Doctor's office or seek emergency care      I want my nurse to describe the side effects of my medications: YES NO   If the answer is no, I understand the side effects of my medications: YES NO   My nurse described the side effects of my medications in a way that I could understand: YES NO   I have taken my personal belongings and my own medications with me at discharge: YES NO            I have received this information and my questions have been answered. I have discussed any concerns I see with this plan with the nurse or physician. I understand these instructions.    Signature of Patient or Responsible Person: _____________________________________    Date: _________________  Time: __________________    Signature of Healthcare  Provider: _______________________________________  Date: _________________  Time: __________________

## 2017-01-17 LAB
ALBUMIN SERPL-MCNC: 2.6 GM/DL (ref 3.4–4.8)
ALP SERPL-CCNC: 88 U/L (ref 38–126)
ALT SERPL-CCNC: 28 U/L (ref 9–52)
ANION GAP SERPL CALCULATED.3IONS-SCNC: 10 MMOL/L (ref 5–15)
AST SERPL-CCNC: 22 U/L (ref 14–36)
BASOPHILS # BLD AUTO: 0.02 X1000/UL (ref 0–0.2)
BASOPHILS NFR BLD AUTO: 0.3 % (ref 0–2)
BILIRUB SERPL-MCNC: 0.4 MG/DL (ref 0.2–1.3)
BUN SERPL-MCNC: 12 MG/DL (ref 7–21)
CALCIUM SERPL-MCNC: 8.5 MG/DL (ref 8.4–10.2)
CHLORIDE SERPL-SCNC: 104 MMOL/L (ref 95–110)
CO2 SERPL-SCNC: 25 MMOL/L (ref 22–31)
CONV AUTO IG#: 0.02 X1000/UL (ref 0.01–0.02)
CONV AUTO IG%: 0.3 % (ref 0–0.5)
CREAT SERPL-MCNC: 0.8 MG/DL (ref 0.5–1)
EOSINOPHIL # BLD AUTO: 0.28 X1000/UL (ref 0–0.7)
EOSINOPHIL NFR BLD AUTO: 3.9 % (ref 0–7)
ERYTHROCYTE [DISTWIDTH] IN BLOOD: 15.6 % (ref 11.5–14.5)
GLUCOSE SERPL-MCNC: 87 MG/DL (ref 60–100)
GRANULOCYTES # BLD AUTO: 3.58 X1000/UL (ref 2–8.6)
GRANULOCYTES NFR BLD AUTO: 49.8 % (ref 37–80)
HCT VFR BLD CALC: 36.1 % (ref 35–45)
HGB BLD-MCNC: 11.8 GM/DL (ref 12–15.5)
INR PPP: 2.5 (ref 0.8–1.2)
INR PPP: 2.7 (ref 0.8–1.2)
LYMPHOCYTES # BLD AUTO: 2.54 X1000/UL (ref 0.6–4.2)
LYMPHOCYTES NFR BLD AUTO: 35.3 % (ref 10–50)
MCH RBC QN: 28.9 PG (ref 26–34)
MCHC RBC-ENTMCNC: 32.7 GM/DL (ref 31.4–36)
MCV RBC: 88.5 FL (ref 80–98)
MONOCYTES # BLD AUTO: 0.75 X1000/UL (ref 0–0.9)
MONOCYTES NFR BLD AUTO: 10.4 % (ref 0–12)
NRBC BLD AUTO-RTO: 0 % (ref 0–0.2)
NRBC SPEC MANUAL: 0 X1000/UL
PLATELET # BLD: 204 X1000/MM3 (ref 150–450)
PMV BLD: 9.6 FL (ref 8–12)
POTASSIUM SERPL-SCNC: 4.2 MMOL/L (ref 3.5–5.1)
PROT SERPL-MCNC: 5.1 GM/DL (ref 6.3–8.6)
PROTHROMBIN TIME: 26 SECONDS (ref 11.1–15.3)
PROTHROMBIN TIME: 28 SECONDS (ref 11.1–15.3)
RBC # BLD: 4.08 MEGA/MM3 (ref 3.77–5.16)
SODIUM SERPL-SCNC: 139 MMOL/L (ref 137–145)
WBC # BLD: 7.2 X1000/UL (ref 3.2–9.8)

## 2017-01-17 PROCEDURE — 9990 COMPREHENSIVE METABOLIC PANEL

## 2017-01-17 PROCEDURE — 85610 PROTHROMBIN TIME: CPT

## 2017-01-17 PROCEDURE — 97116 GAIT TRAINING THERAPY: CPT

## 2017-01-17 PROCEDURE — 9990

## 2017-01-17 PROCEDURE — 93225 XTRNL ECG REC<48 HRS REC: CPT

## 2017-01-17 PROCEDURE — 97110 THERAPEUTIC EXERCISES: CPT

## 2017-01-17 PROCEDURE — 80053 COMPREHEN METABOLIC PANEL: CPT

## 2017-01-17 PROCEDURE — 97530 THERAPEUTIC ACTIVITIES: CPT

## 2017-01-17 PROCEDURE — 85025 COMPLETE CBC W/AUTO DIFF WBC: CPT

## 2017-01-17 PROCEDURE — 9990 CBC

## 2017-01-17 PROCEDURE — 97535 SELF CARE MNGMENT TRAINING: CPT

## 2017-01-17 NOTE — CONSULTS
[facility]                                CONSULTATION     NAME:  KATHLEEN CHEATHAM         UNIT #:  [id number]  KRISTYN  :  1933              ACCT #:  [id number]  ROOM:  320 1                  TO DOCTOR:                                GEORGIA LEÓN MD  DICTATED:  2017         TRANSCRIBED:  1117                          2017 1514        DATE OF CONSULT:  2017     REQUESTING SOURCE:     SURGERY CONSULTATION     HISTORY OF PRESENT ILLNESS:  The patient is an 83-year-old lady who was  admitted apparently for worsening medical status and perhaps a recent  fall with some abdominal pain. She was evaluated with a CT scan and  found to have vertebral compression fractures, and for this reason the  admitting doctors wished to have myself, Orthopedic Service, evaluate  the patient. She is presently admitted to the observation area of the  hospital. She is not complaining of back pain. She is lying in a  hospital bed but states that she has not had any recent back pain. She  is here primarily for abdominal pain.     REVIEW OF SYSTEMS:  No fevers, chills, night sweats, head injury, loss  of consciousness, double vision, neck pain, headache, cough, hemoptysis,  hematemesis, chest pain, heart pain, heart palpitations. Positive for  abdominal pain. Negative for nausea or vomiting. No dysuria, easy  bruising, spontaneous bleeding, immune suppression, recurrent infection,  rash, numbness, weakness, leg pain, seizure.     PAST MEDICAL/SURGICAL HISTORY:  1.   Osteoporosis.  2.   Thromboembolic disease.  3.   Cardiac arrhythmia.  4.   Sick sinus syndrome.  5.   History of IVC filter placement.  6.   history of placement of a pacemaker in .     MEDICATIONS:  1.   Plavix.  2.   Cardizem.  3.   Neurontin.  4.   Levothyroxine.  5.   Zestril.  6.   Ativan.  7.   Nitroglycerin pills.  8.   Percocet for pain.  9.   Warfarin.  10.  Ambien.     ALLERGIES:  1.   ASPIRIN.  2.    BACITRACIN.  3.   AUGMENTIN.  4.   PENICILLIN.     PHYSICAL EXAMINATION:     VITAL SIGNS:  Heart rate 60, temperature 97, respirations 18, blood  pressure 135/64, oxygen saturations 98% on 2L of oxygen.     GENERAL:  She is evaluated in the inpatient hospital setting. She is  sleeping but is arousable. She can follow commands. Speech is clear. She  is an elderly-appearing frail-appearing lady who appears stated age of  in the 80s.     HEENT:  No preauricular or periorbital ecchymosis, edema, abrasions.  Extraocular muscles are intact. No facial asymmetry.     NECK:  Cervical alignment is intact. Soft. No jugular venous distention.     MUSCULOSKELETAL:  Thoracic alignment is intact. No step-off or  deformity. Lumbar alignment is intact. No step-off or deformity. There  is no focal point tenderness with palpation of the thoracic spine. No  focal point tenderness with palpation of the lumbar spine.     LUNGS:  No wheezes or rhonchi.     HEART:  No precordial thrill or rub.     ABDOMEN:  Soft. No distention. No focal point tenderness.     EXTREMITIES:  Upper extremities have normal pulse. There is no  deformity. No venous stasis disease. She can flex and extend the elbow  and wrist. Lower extremities have normal pulses. No deformities. No  venous stasis disease. She can flex and extend her knees, hips and  ankles.     DIAGNOSTIC DATA:  CT scan demonstrates a fracture of T10, fracture of  T10, fracture of L3. I reviewed the previous CT scan. She had a CT scan  performed in 03/2016 which shows fracture of T10 and fracture of L3,  although the T12 vertebra was not fractured at that time. She is unable  to receive an MRI. The fractures of T10, T12 and L3 all appear chronic.  There is no indication of spinal stenosis, no indication of neurologic  deficit. White blood cell count 6000, hemoglobin 13, hematocrit 39,  platelet count 243,000. Sodium 139, potassium 4.8, chloride 103, carbon  dioxide 25.     ASSESSMENT:  1.    Vertebral compression fracture.  2.   Severe osteoporosis.     RECOMMENDATIONS:  Acuity of the fracture is likely chronic. Recommend  pain medicine as needed. She can have physical therapy. No need for a  brace at this point. No indication for surgical treatment at this point.  She really is not complaining of any back pain. Recommend that she have  outpatient followup with a DEXA scan. She needs to be on osteoporosis  medication for the severity of her osteoporosis. She can follow up with  her Primary Care Physician. There is really no need for Orthopedic  Surgery followup.     Thank you for the consult.        ALVERTO VASQUEZ MD  Electronically Signed  01/17/2017 14:14:15  By MD FILI MELISSA/ton  [id number]  cc:   ALVERTO VASQUEZ MD                                CONSULTATION  Page #page

## 2017-01-18 LAB
ALBUMIN SERPL-MCNC: 2.6 GM/DL (ref 3.4–4.8)
ALP SERPL-CCNC: 90 U/L (ref 38–126)
ALT SERPL-CCNC: 23 U/L (ref 9–52)
ANION GAP SERPL CALCULATED.3IONS-SCNC: 8 MMOL/L (ref 5–15)
AST SERPL-CCNC: 21 U/L (ref 14–36)
BASOPHILS # BLD AUTO: 0.03 X1000/UL (ref 0–0.2)
BASOPHILS NFR BLD AUTO: 0.5 % (ref 0–2)
BILIRUB SERPL-MCNC: 0.3 MG/DL (ref 0.2–1.3)
BUN SERPL-MCNC: 9 MG/DL (ref 7–21)
CALCIUM SERPL-MCNC: 8.6 MG/DL (ref 8.4–10.2)
CHLORIDE SERPL-SCNC: 102 MMOL/L (ref 95–110)
CO2 SERPL-SCNC: 29 MMOL/L (ref 22–31)
CONV AUTO IG#: 0.02 X1000/UL (ref 0.01–0.02)
CONV AUTO IG%: 0.3 % (ref 0–0.5)
CREAT SERPL-MCNC: 0.7 MG/DL (ref 0.5–1)
EOSINOPHIL # BLD AUTO: 0.3 X1000/UL (ref 0–0.7)
EOSINOPHIL NFR BLD AUTO: 4.6 % (ref 0–7)
ERYTHROCYTE [DISTWIDTH] IN BLOOD: 15.3 % (ref 11.5–14.5)
GLUCOSE SERPL-MCNC: 97 MG/DL (ref 60–100)
GRANULOCYTES # BLD AUTO: 3.29 X1000/UL (ref 2–8.6)
GRANULOCYTES NFR BLD AUTO: 50 % (ref 37–80)
HCT VFR BLD CALC: 40.6 % (ref 35–45)
HGB BLD-MCNC: 13 GM/DL (ref 12–15.5)
INR PPP: 1.4 (ref 0.8–1.2)
LYMPHOCYTES # BLD AUTO: 2.22 X1000/UL (ref 0.6–4.2)
LYMPHOCYTES NFR BLD AUTO: 33.8 % (ref 10–50)
Lab: ABNORMAL
MCH RBC QN: 28.6 PG (ref 26–34)
MCHC RBC-ENTMCNC: 32 GM/DL (ref 31.4–36)
MCV RBC: 89.4 FL (ref 80–98)
MONOCYTES # BLD AUTO: 0.71 X1000/UL (ref 0–0.9)
MONOCYTES NFR BLD AUTO: 10.8 % (ref 0–12)
NRBC BLD AUTO-RTO: 0 % (ref 0–0.2)
NRBC SPEC MANUAL: 0 X1000/UL
PLATELET # BLD: 208 X1000/MM3 (ref 150–450)
PMV BLD: 9 FL (ref 8–12)
POTASSIUM SERPL-SCNC: 3.5 MMOL/L (ref 3.5–5.1)
PROT SERPL-MCNC: 5.2 GM/DL (ref 6.3–8.6)
RBC # BLD: 4.54 MEGA/MM3 (ref 3.77–5.16)
SODIUM SERPL-SCNC: 139 MMOL/L (ref 137–145)
WBC # BLD: 6.6 X1000/UL (ref 3.2–9.8)

## 2017-01-18 PROCEDURE — 9990

## 2017-01-18 PROCEDURE — 85025 COMPLETE CBC W/AUTO DIFF WBC: CPT

## 2017-01-18 PROCEDURE — 85610 PROTHROMBIN TIME: CPT

## 2017-01-18 PROCEDURE — 9990 CBC

## 2017-01-18 PROCEDURE — 93225 XTRNL ECG REC<48 HRS REC: CPT

## 2017-01-18 PROCEDURE — 9990 COMPREHENSIVE METABOLIC PANEL

## 2017-01-18 PROCEDURE — 80053 COMPREHEN METABOLIC PANEL: CPT

## 2017-01-19 LAB
ANION GAP SERPL CALCULATED.3IONS-SCNC: 7 MMOL/L (ref 5–15)
BUN SERPL-MCNC: 10 MG/DL (ref 7–21)
CALCIUM SERPL-MCNC: 8.4 MG/DL (ref 8.4–10.2)
CHLORIDE SERPL-SCNC: 104 MMOL/L (ref 95–110)
CO2 SERPL-SCNC: 28 MMOL/L (ref 22–31)
CREAT SERPL-MCNC: 0.7 MG/DL (ref 0.5–1)
GLUCOSE SERPL-MCNC: 73 MG/DL (ref 60–100)
INR PPP: 1.2 (ref 0.8–1.2)
POTASSIUM SERPL-SCNC: 3.8 MMOL/L (ref 3.5–5.1)
PROTHROMBIN TIME: 15.1 SECONDS (ref 11.1–15.3)
SODIUM SERPL-SCNC: 139 MMOL/L (ref 137–145)

## 2017-01-19 PROCEDURE — 9990

## 2017-01-19 PROCEDURE — 93005 ELECTROCARDIOGRAM TRACING: CPT

## 2017-01-19 PROCEDURE — 93225 XTRNL ECG REC<48 HRS REC: CPT

## 2017-01-19 PROCEDURE — 85610 PROTHROMBIN TIME: CPT

## 2017-01-19 PROCEDURE — 97535 SELF CARE MNGMENT TRAINING: CPT

## 2017-01-19 PROCEDURE — 80048 BASIC METABOLIC PNL TOTAL CA: CPT

## 2017-01-19 PROCEDURE — 97116 GAIT TRAINING THERAPY: CPT

## 2017-01-19 PROCEDURE — 9990 BASIC METABOLIC PANEL

## 2017-01-19 PROCEDURE — 97110 THERAPEUTIC EXERCISES: CPT

## 2017-01-20 PROBLEM — R07.9 CHEST PAIN: Status: ACTIVE | Noted: 2017-01-20

## 2017-01-20 LAB
INR PPP: 1.2 (ref 0.8–1.2)
Lab: NORMAL

## 2017-01-20 PROCEDURE — 97116 GAIT TRAINING THERAPY: CPT

## 2017-01-20 PROCEDURE — 97110 THERAPEUTIC EXERCISES: CPT

## 2017-01-20 PROCEDURE — 9990

## 2017-01-20 PROCEDURE — 93225 XTRNL ECG REC<48 HRS REC: CPT

## 2017-01-20 PROCEDURE — 93005 ELECTROCARDIOGRAM TRACING: CPT

## 2017-01-20 PROCEDURE — 85610 PROTHROMBIN TIME: CPT

## 2017-01-20 RX ORDER — ALBUTEROL SULFATE 90 UG/1
2 AEROSOL, METERED RESPIRATORY (INHALATION) EVERY 6 HOURS
COMMUNITY

## 2017-01-20 RX ORDER — DOCUSATE SODIUM 100 MG/1
100 CAPSULE, LIQUID FILLED ORAL EVERY 12 HOURS SCHEDULED
Status: DISCONTINUED | OUTPATIENT
Start: 2017-01-21 | End: 2017-01-21 | Stop reason: HOSPADM

## 2017-01-20 RX ORDER — PROMETHAZINE HYDROCHLORIDE 25 MG/1
25 TABLET ORAL EVERY 4 HOURS PRN
COMMUNITY

## 2017-01-20 RX ORDER — POLYETHYLENE GLYCOL 3350 17 G/17G
17 POWDER, FOR SOLUTION ORAL DAILY
Status: DISCONTINUED | OUTPATIENT
Start: 2017-01-21 | End: 2017-01-21 | Stop reason: HOSPADM

## 2017-01-20 RX ORDER — GABAPENTIN 100 MG/1
100 CAPSULE ORAL EVERY 8 HOURS SCHEDULED
Status: DISCONTINUED | OUTPATIENT
Start: 2017-01-21 | End: 2017-01-21 | Stop reason: HOSPADM

## 2017-01-20 RX ORDER — DOFETILIDE 0.25 MG/1
250 CAPSULE ORAL EVERY 12 HOURS SCHEDULED
Status: DISCONTINUED | OUTPATIENT
Start: 2017-01-21 | End: 2017-01-21 | Stop reason: HOSPADM

## 2017-01-20 RX ORDER — OXYCODONE AND ACETAMINOPHEN 10; 325 MG/1; MG/1
1 TABLET ORAL EVERY 6 HOURS PRN
Status: DISCONTINUED | OUTPATIENT
Start: 2017-01-21 | End: 2017-01-21 | Stop reason: HOSPADM

## 2017-01-20 RX ORDER — SODIUM CHLORIDE 9 MG/ML
50 INJECTION, SOLUTION INTRAVENOUS CONTINUOUS
Status: DISCONTINUED | OUTPATIENT
Start: 2017-01-21 | End: 2017-01-20

## 2017-01-20 RX ORDER — AMIODARONE HYDROCHLORIDE 200 MG/1
200 TABLET ORAL DAILY
COMMUNITY
End: 2017-01-21 | Stop reason: HOSPADM

## 2017-01-20 RX ORDER — NEBIVOLOL 10 MG/1
10 TABLET ORAL DAILY
COMMUNITY
End: 2017-01-21 | Stop reason: HOSPADM

## 2017-01-20 RX ORDER — ONDANSETRON 2 MG/ML
4 INJECTION INTRAMUSCULAR; INTRAVENOUS EVERY 8 HOURS PRN
Status: DISCONTINUED | OUTPATIENT
Start: 2017-01-21 | End: 2017-01-21 | Stop reason: HOSPADM

## 2017-01-20 RX ORDER — LISINOPRIL 5 MG/1
5 TABLET ORAL
Status: DISCONTINUED | OUTPATIENT
Start: 2017-01-21 | End: 2017-01-21 | Stop reason: HOSPADM

## 2017-01-20 RX ORDER — LORAZEPAM 2 MG/ML
0.5 INJECTION INTRAMUSCULAR EVERY 4 HOURS PRN
Status: DISCONTINUED | OUTPATIENT
Start: 2017-01-21 | End: 2017-01-21 | Stop reason: HOSPADM

## 2017-01-20 RX ORDER — LIDOCAINE 50 MG/G
2 PATCH TOPICAL
Status: DISCONTINUED | OUTPATIENT
Start: 2017-01-21 | End: 2017-01-21 | Stop reason: HOSPADM

## 2017-01-20 RX ORDER — LEVOTHYROXINE SODIUM 0.05 MG/1
50 TABLET ORAL
Status: DISCONTINUED | OUTPATIENT
Start: 2017-01-21 | End: 2017-01-21 | Stop reason: HOSPADM

## 2017-01-20 RX ORDER — DILTIAZEM HYDROCHLORIDE 240 MG/1
240 CAPSULE, COATED, EXTENDED RELEASE ORAL NIGHTLY
Status: DISCONTINUED | OUTPATIENT
Start: 2017-01-21 | End: 2017-01-21 | Stop reason: HOSPADM

## 2017-01-20 RX ORDER — ESCITALOPRAM OXALATE 10 MG/1
10 TABLET ORAL DAILY
Status: ON HOLD | COMMUNITY
End: 2017-01-21

## 2017-01-20 RX ORDER — CLOPIDOGREL BISULFATE 75 MG/1
75 TABLET ORAL DAILY
Status: DISCONTINUED | OUTPATIENT
Start: 2017-01-21 | End: 2017-01-21 | Stop reason: HOSPADM

## 2017-01-20 RX ORDER — LIDOCAINE 50 MG/G
PATCH TOPICAL DAILY
Refills: 3 | COMMUNITY
Start: 2016-12-10

## 2017-01-20 RX ORDER — EZETIMIBE 10 MG/1
10 TABLET ORAL DAILY
COMMUNITY
End: 2017-01-21 | Stop reason: HOSPADM

## 2017-01-20 RX ORDER — SODIUM CHLORIDE 0.9 % (FLUSH) 0.9 %
3 SYRINGE (ML) INJECTION EVERY 12 HOURS SCHEDULED
Status: DISCONTINUED | OUTPATIENT
Start: 2017-01-21 | End: 2017-01-21 | Stop reason: HOSPADM

## 2017-01-20 RX ORDER — NITROGLYCERIN 0.4 MG/1
0.4 TABLET SUBLINGUAL
Status: DISCONTINUED | OUTPATIENT
Start: 2017-01-21 | End: 2017-01-21 | Stop reason: HOSPADM

## 2017-01-20 RX ORDER — SODIUM CHLORIDE 9 MG/ML
50 INJECTION, SOLUTION INTRAVENOUS CONTINUOUS
Status: DISCONTINUED | OUTPATIENT
Start: 2017-01-21 | End: 2017-01-21 | Stop reason: HOSPADM

## 2017-01-20 RX ORDER — BUMETANIDE 0.5 MG/1
0.5 TABLET ORAL 2 TIMES DAILY
Status: DISCONTINUED | OUTPATIENT
Start: 2017-01-21 | End: 2017-01-21 | Stop reason: HOSPADM

## 2017-01-20 RX ORDER — ZOLPIDEM TARTRATE 5 MG/1
10 TABLET ORAL NIGHTLY PRN
Status: DISCONTINUED | OUTPATIENT
Start: 2017-01-21 | End: 2017-01-21 | Stop reason: HOSPADM

## 2017-01-20 RX ORDER — PANTOPRAZOLE SODIUM 40 MG/1
40 TABLET, DELAYED RELEASE ORAL NIGHTLY
Status: DISCONTINUED | OUTPATIENT
Start: 2017-01-21 | End: 2017-01-21 | Stop reason: HOSPADM

## 2017-01-20 RX ORDER — AMLODIPINE BESYLATE 5 MG/1
5 TABLET ORAL DAILY
COMMUNITY
End: 2017-01-21 | Stop reason: HOSPADM

## 2017-01-20 RX ORDER — SODIUM CHLORIDE 0.9 % (FLUSH) 0.9 %
3 SYRINGE (ML) INJECTION AS NEEDED
Status: DISCONTINUED | OUTPATIENT
Start: 2017-01-21 | End: 2017-01-21 | Stop reason: HOSPADM

## 2017-01-20 RX ORDER — POTASSIUM CHLORIDE 750 MG/1
10 TABLET, EXTENDED RELEASE ORAL DAILY
Status: DISCONTINUED | OUTPATIENT
Start: 2017-01-21 | End: 2017-01-21 | Stop reason: HOSPADM

## 2017-01-20 NOTE — PLAN OF CARE
Problem: Patient Care Overview (Adult)  Goal: Plan of Care Review  Outcome: Ongoing (interventions implemented as appropriate)    01/20/17 1007   Coping/Psychosocial Response Interventions   Plan Of Care Reviewed With patient   Outcome Evaluation   Outcome Summary/Follow up Plan Epic Cut over: Pt is SBA with BADLs at this time.    Patient Care Overview   Progress progress toward functional goals as expected       Goal: Discharge Needs Assessment  Outcome: Ongoing (interventions implemented as appropriate)    01/20/17 1007   Discharge Needs Assessment   Discharge Facility/Level Of Care Needs (home)         Problem: Inpatient Occupational Therapy  Goal: Transfer Training Goal 1 STG- OT  Outcome: Ongoing (interventions implemented as appropriate)    01/20/17 1007   Transfer Training OT STG   Transfer Training OT STG, Date Established 01/20/17   Transfer Training OT STG, Time to Achieve 3 days   Transfer Training OT STG, [place] Level independent   Transfer Training OT STG, Date Goal Reviewed 01/20/17   Transfer Training OT STG, Outcome goal ongoing       Goal: Bathing Goal STG- OT  Outcome: Ongoing (interventions implemented as appropriate)    01/20/17 1007   Bathing OT STG   Bathing Goal OT STG, Date Established 01/20/17   Bathing Goal OT STG, Time to Achieve 3 days   Bathing Goal OT STG, [place] Level independent   Bathing Goal OT STG, Date Goal Reviewed 01/20/17   Bathing Goal OT STG, Outcome goal ongoing       Goal: LB Dressing Goal STG- OT  Outcome: Ongoing (interventions implemented as appropriate)    01/20/17 1007   LB Dressing OT STG   LB Dressing Goal OT STG, Date Established 01/20/17   LB Dressing Goal OT STG, Time to Achieve 3 days   LB Dressing Goal OT STG, [place] Level independent   LB Dressing Goal OT STG, Date Goal Reviewed 01/20/17   LB Dressing Goal OT STG, Outcome goal ongoing       Goal: UB Dressing Goal STG- OT  Outcome: Ongoing (interventions implemented as appropriate)    01/20/17  1007   UB Dressing OT STG   UB Dressing Goal OT STG, Date Established 01/20/17   UB Dressing Goal OT STG, Time to Achieve 3 days   UB Dressing Goal OT STG, [place] Level independent   UB Dressing Goal OT STG, Date Goal Reviewed 01/20/17   UB Dressing Goal OT STG, Outcome goal ongoing       Goal: ADL Goal LTG- OT  Outcome: Ongoing (interventions implemented as appropriate)    01/20/17 1007   ADL OT LTG   ADL OT LTG, Date Established 01/20/17   ADL OT LTG, Time to Achieve 3 days   ADL OT LTG, [place] Level independent   ADL OT LTG, Date Goal Reviewed 01/20/17   ADL OT LTG, Outcome goal ongoing

## 2017-01-20 NOTE — PLAN OF CARE
Problem: Patient Care Overview (Adult)  Goal: Plan of Care Review  Outcome: Ongoing (interventions implemented as appropriate)    01/14/17 0913   Coping/Psychosocial Response Interventions   Plan Of Care Reviewed With patient   Outcome Evaluation   Outcome Summary/Follow up Plan Pt with CP, compression fx, a-fib, CHF who requires SBA for gait with RW and may benefit from skilled PT for independence with transfers, gait and steps         Problem: Inpatient Physical Therapy  Goal: Transfer Training Goal 1 LTG- PT  Outcome: Ongoing (interventions implemented as appropriate)    01/20/17 0913   Transfer Training PT LTG   Transfer Training PT LTG, Date Established 01/14/17   Transfer Training PT LTG, Time to Achieve by discharge   Transfer Training PT LTG, Activity Type all transfers   Transfer Training PT LTG, [place] Level conditional independence   Transfer Training PT LTG, Assist Device walker, rolling       Goal: Gait Training Goal LTG- PT  Outcome: Ongoing (interventions implemented as appropriate)    01/20/17 0913   Gait Training PT LTG   Gait Training Goal PT LTG, Date Established 01/14/17   Gait Training Goal PT LTG, Time to Achieve by discharge   Gait Training Goal PT LTG, [place] Level conditional independence   Gait Training Goal PT LTG, Assist Device walker, rolling   Gait Training Goal PT LTG, Distance to Achieve 150       Goal: Stair Training Goal LTG- PT  Outcome: Ongoing (interventions implemented as appropriate)    01/14/17 0913   Stair Training PT LTG   Stair Training Goal PT LTG, Date Established 01/14/17   Stair Training Goal PT LTG, Time to Achieve by discharge   Stair Training Goal PT LTG, Number of Steps 2   Stair Training Goal PT LTG, [place] Level independent   Stair Training Goal PT LTG, Assist Device 2 handrails

## 2017-01-21 VITALS
HEART RATE: 69 BPM | SYSTOLIC BLOOD PRESSURE: 141 MMHG | RESPIRATION RATE: 18 BRPM | BODY MASS INDEX: 21.92 KG/M2 | DIASTOLIC BLOOD PRESSURE: 62 MMHG | HEIGHT: 63 IN | TEMPERATURE: 98.2 F | OXYGEN SATURATION: 95 % | WEIGHT: 123.72 LBS

## 2017-01-21 LAB
INR PPP: 2.2 (ref 0.8–1.2)
PROTHROMBIN TIME: ABNORMAL SECONDS (ref 11–15)

## 2017-01-21 PROCEDURE — 97535 SELF CARE MNGMENT TRAINING: CPT

## 2017-01-21 PROCEDURE — 85610 PROTHROMBIN TIME: CPT | Performed by: INTERNAL MEDICINE

## 2017-01-21 PROCEDURE — 97110 THERAPEUTIC EXERCISES: CPT

## 2017-01-21 PROCEDURE — 97116 GAIT TRAINING THERAPY: CPT

## 2017-01-21 RX ORDER — ESCITALOPRAM OXALATE 10 MG/1
10 TABLET ORAL DAILY
Status: DISCONTINUED | OUTPATIENT
Start: 2017-01-21 | End: 2017-01-21 | Stop reason: HOSPADM

## 2017-01-21 RX ORDER — DOFETILIDE 0.25 MG/1
250 CAPSULE ORAL EVERY 12 HOURS SCHEDULED
Qty: 60 CAPSULE | Refills: 0 | Status: SHIPPED | OUTPATIENT
Start: 2017-01-21 | End: 2017-02-17 | Stop reason: ALTCHOICE

## 2017-01-21 RX ORDER — LISINOPRIL 5 MG/1
5 TABLET ORAL
Qty: 30 TABLET | Refills: 0 | Status: SHIPPED | OUTPATIENT
Start: 2017-01-21 | End: 2017-02-17

## 2017-01-21 RX ORDER — NITROGLYCERIN 0.4 MG/1
0.4 TABLET SUBLINGUAL
Qty: 25 TABLET | Refills: 0 | Status: SHIPPED | OUTPATIENT
Start: 2017-01-21

## 2017-01-21 RX ORDER — WARFARIN SODIUM 1 MG/1
1 TABLET ORAL
Status: DISCONTINUED | OUTPATIENT
Start: 2017-01-21 | End: 2017-01-21 | Stop reason: HOSPADM

## 2017-01-21 RX ORDER — ESCITALOPRAM OXALATE 10 MG/1
10 TABLET ORAL DAILY
Qty: 30 TABLET | Refills: 0 | Status: SHIPPED | OUTPATIENT
Start: 2017-01-21 | End: 2017-02-17

## 2017-01-21 RX ADMIN — GABAPENTIN 100 MG: 100 CAPSULE ORAL at 06:19

## 2017-01-21 RX ADMIN — LINACLOTIDE 290 MCG: 290 CAPSULE, GELATIN COATED ORAL at 09:30

## 2017-01-21 RX ADMIN — LISINOPRIL 5 MG: 5 TABLET ORAL at 09:03

## 2017-01-21 RX ADMIN — MAGNESIUM OXIDE TAB 400 MG (241.3 MG ELEMENTAL MG) 400 MG: 400 (241.3 MG) TAB at 09:03

## 2017-01-21 RX ADMIN — ESCITALOPRAM OXALATE 10 MG: 10 TABLET ORAL at 16:18

## 2017-01-21 RX ADMIN — CLOPIDOGREL BISULFATE 75 MG: 75 TABLET ORAL at 09:02

## 2017-01-21 RX ADMIN — LIDOCAINE 2 PATCH: 50 PATCH CUTANEOUS at 11:07

## 2017-01-21 RX ADMIN — DOFETILIDE 250 MCG: 0.25 CAPSULE ORAL at 09:03

## 2017-01-21 RX ADMIN — GABAPENTIN 100 MG: 100 CAPSULE ORAL at 13:14

## 2017-01-21 RX ADMIN — POTASSIUM CHLORIDE 10 MEQ: 750 TABLET, EXTENDED RELEASE ORAL at 09:03

## 2017-01-21 RX ADMIN — OXYCODONE HYDROCHLORIDE AND ACETAMINOPHEN 1 TABLET: 10; 325 TABLET ORAL at 13:12

## 2017-01-21 RX ADMIN — BUMETANIDE 0.5 MG: 0.5 TABLET ORAL at 09:03

## 2017-01-21 RX ADMIN — OXYCODONE HYDROCHLORIDE AND ACETAMINOPHEN 1 TABLET: 10; 325 TABLET ORAL at 06:25

## 2017-01-21 RX ADMIN — LEVOTHYROXINE SODIUM 50 MCG: 50 TABLET ORAL at 06:19

## 2017-01-21 RX ADMIN — Medication 1 TABLET: at 09:10

## 2017-01-21 NOTE — DISCHARGE SUMMARY
Date of Admission: 2017  Date of Discharge:  2017    Discharge Diagnosis:    Refer to my assessment and plan below    Presenting Problem/History of Present Illness    Chest pain    Hospital Course  Patient was admitted with acute chest pain.  Troponin was elevated.  Patient also had paroxysmal atrial fibrillation with rapid ventricular response.  Seen in consultation by cardiologist.  Patient states chest pain improved with conservative management.  Initially patient was planned for cardiac ablation but it was canceled due to high risk.  Patient was started back on anticoagulation treatment.  Patient was started on Tikosyn and heart rate got under better control.  As patient's chest pain and shortness of breath improved and pro time got therapeutic patient is being discharged in improved and stable condition to be followed up as an outpatient.  Also for abdominal pain CT scan of the abdomen and pelvis done suggestive of diverticulosis but without any acute problem.  Patient did not have any subsequent abdominal pain.      Procedures Performed:  Refer to chart      Consults:  Cardiology with Dr. Biswas  Consults     No orders found from 2016 to 2017.          Pertinent Test Results:    Refer to chart    Condition on Discharge:     Vital Signs past 24hrs  BP range: BP: (122-141)/(62-80) 141/62  Pulse range: Heart Rate:  [60-97] 69  Resp rate range: Resp:  [18] 18  Temp range: Temp (24hrs), Av.5 °F (36.4 °C), Min:96.7 °F (35.9 °C), Max:98.2 °F (36.8 °C)    O2 Device: room airFlow (L/min):  [1.5] 1.5  Oxygen range:SpO2:  [93 %-95 %] 95 %   123 lb 11.6 oz (56.1 kg); Body mass index is 21.92 kg/(m^2).    Intake/Output Summary (Last 24 hours) at 17 1638  Last data filed at 17 1316   Gross per 24 hour   Intake    600 ml   Output      0 ml   Net    600 ml       Physical Exam  Melisa Butts MD Physician Signed  Progress Notes Date of Service: 2017  2:04 PM           hospitalist  Active Problems:  Heart valve replaced  Parkinson disease  Paroxysmal atrial fibrillation  Hyperlipemia  Essential hypertension, malignant  Hypothyroidism, adult  Chest pain           Subjective: Having panic attacks. Otherwise doing well. No chest pain or shortness of breath. No new complaints. Chart reviewed.  Family members including  and daughter are at bedside.  Past medical history: Positive for panic attacks in the past, was on Lexapro but was taken off one year ago.           Review of Systems:   Review of Systems   All other systems reviewed and are negative.        Objective        Vital Signs: Monitor: Pacemaker rhythm, regular  Temp: [96.7 °F (35.9 °C)] 96.7 °F (35.9 °C)  Heart Rate: [66-97] 97  Resp: [18] 18  BP: (122)/(80) 122/80     Physical Exam:  Physical Exam   Constitutional: She is oriented to person, place, and time. She appears well-developed. No distress.   HENT:   Mouth/Throat: Oropharynx is clear and moist.   Eyes: EOM are normal. No scleral icterus.   Neck: Neck supple. No JVD present.   Cardiovascular: Normal rate and regular rhythm.   Pulmonary/Chest: Effort normal and breath sounds normal.   Abdominal: Soft. She exhibits no distension. There is no tenderness.   Musculoskeletal: Normal range of motion. She exhibits no edema.   Neurological: She is alert and oriented to person, place, and time. No cranial nerve deficit.   Skin: Skin is warm and dry. No rash noted. She is not diaphoretic. No erythema.   Psychiatric: She has a normal mood and affect. Her behavior is normal.             Results Review:       Lab Results (last 24 hours)     Procedure Component Value Units Date/Time             Protime-INR, Fingerstick [[id number]] (Abnormal) Collected: 01/21/17 0728     Specimen: Capillary Blood Updated: 01/21/17 0730       Protime -- Seconds           Fingerstick protime reports without seconds           INR 2.20 (H)       Narrative:       Therapeutic range for most  indications is 2.0-3.0 INR,  or 2.5-3.5 for mechanical heart valves.             Imaging Results (last 24 hours)      ** No results found for the last 24 hours. **                   Medication Review: medications have been reviewed     Assessment/Plan:  1. Paroxysmal atrial fibrillation with rapid ventricular response: Improved now, currently in pacemaker rhythm.  Not a candidate for ablation due to high risk, medical management as per cardiologist Dr. True Starks time is today therapeutic, continue 1 more dose of Lovenox, plan for discharge tomorrow  2. Hypertension: Controlled  3. Chest pain: Resolved  4. Status post AVR, Peep valve  5. Status post pacemaker placement  6. Status post IVC filter placement  7. Hypothyroidism: Stable  8. Chronic backache: Controlled  9. History of anxiety with recurrent panic attacks: Start on Lexapro     Length of stay: More than 2 midnights  DVT prophylaxis: Warfarin and Lovenox  CODE STATUS: Full code  Disposition: Home today  Discussed with family members                                        Discharge Disposition  Home or Self Care    Discharge Medications     Your medication list      START taking these medications       Instructions Last Dose Given Next Dose Due    dofetilide 250 MCG capsule   Commonly known as:  TIKOSYN        Take 1 capsule by mouth Every 12 (Twelve) Hours.         nitroglycerin 0.4 MG SL tablet   Commonly known as:  NITROSTAT        Place 1 tablet under the tongue Every 5 (Five) Minutes As Needed for chest pain. Take no more than 3 doses in 15 minutes.           CHANGE how you take these medications       Instructions Last Dose Given Next Dose Due    lisinopril 5 MG tablet   Commonly known as:  PRINIVILZESTRIL   What changed:    - medication strength  - how much to take  - when to take this        Take 1 tablet by mouth Daily.           CONTINUE taking these medications       Instructions Last Dose Given Next Dose Due    albuterol 108 (90 BASE) MCG/ACT  inhaler   Commonly known as:  PROVENTIL HFA;VENTOLIN HFA              bumetanide 1 MG tablet   Commonly known as:  BUMEX              diltiaZEM  MG 24 hr capsule   Commonly known as:  CARDIZEM CD              docusate sodium 100 MG capsule   Commonly known as:  COLACE              escitalopram 10 MG tablet   Commonly known as:  LEXAPRO        Take 1 tablet by mouth Daily.         gabapentin 100 MG capsule   Commonly known as:  NEURONTIN              levothyroxine 50 MCG tablet   Commonly known as:  SYNTHROID, LEVOTHROID              lidocaine 5 %   Commonly known as:  LIDODERM              linaclotide 290 MCG capsule capsule   Commonly known as:  LINZESS              magnesium oxide 400 (241.3 MG) MG tablet tablet   Commonly known as:  MAGOX              MIRALAX PO              MULTIVITAL PO              O2   Commonly known as:  OXYGEN              omeprazole 20 MG capsule   Commonly known as:  priLOSEC              oxyCODONE-acetaminophen 5-325 MG per tablet   Commonly known as:  PERCOCET              potassium chloride 10 MEQ CR tablet   Commonly known as:  K-DUR,KLOR-CON              promethazine 25 MG tablet   Commonly known as:  PHENERGAN              warfarin 1 MG tablet   Commonly known as:  COUMADIN              zolpidem 10 MG tablet   Commonly known as:  AMBIEN                STOP taking these medications          amiodarone 200 MG tablet   Commonly known as:  PACERONE           amLODIPine 5 MG tablet   Commonly known as:  NORVASC           ezetimibe 10 MG tablet   Commonly known as:  ZETIA           nebivolol 10 MG tablet   Commonly known as:  BYSTOLIC                Where to Get Your Medications      You can get these medications from any pharmacy     Bring a paper prescription for each of these medications    • dofetilide 250 MCG capsule   • escitalopram 10 MG tablet   • lisinopril 5 MG tablet   • nitroglycerin 0.4 MG SL tablet             Discharge Diet:  Diet Order   Procedures   • Diet Regular;  Cardiac       Activity at Discharge:    As tolerated    Follow-up Appointments  Follow-up Information     Follow up with [facility] .    Specialty:  Home Health Services    Contact information:    [address] [place] [id number] [phone]        Referrals and Follow-ups to Schedule                      Discharge Instructions  Per nurses    Test Results Pending at Discharge       Melisa Butts MD  01/21/17  4:38 PM    Time: I spent  35 mins in the discharge planning of this patient.    EMR Dragon/Transcription disclaimer:   Much of this encounter note is an electronic transcription/translation of spoken language to printed text. The electronic translation of spoken language may permit erroneous, or at times, nonsensical words or phrases to be inadvertently transcribed; Although I have reviewed the note for such errors, some may still exist.

## 2017-01-21 NOTE — PROGRESS NOTES
Discharge Planning Assessment  [facility]     Patient Name: Shefali Kennedy  MRN: [id number]  Today's Date: 1/21/2017    Admit Date: 1/16/2017          Discharge Needs Assessment       01/21/17 1230    Living Environment    Lives With alone    Living Arrangements house    Provides Primary Care For no one    Quality Of Family Relationships supportive    Able to Return to Prior Living Arrangements yes    Discharge Needs Assessment    Concerns To Be Addressed adjustment to diagnosis/illness concerns    Outpatient/Agency/Support Group Needs homecare agency (specify level of care)   [facility]    Discharge Facility/Level Of Care Needs home with home health   [facility]    Transportation Available car    Current Discharge Risk lives alone    Discharge Disposition [facility] healthcare service   [facility]            Discharge Plan       01/21/17 1232    Case Management/Social Work Plan    Plan Home with Home Health    Patient/Family In Agreement With Plan yes    Additional Comments [facility] has been set up. Report will need to be called to [phone]. Orders will need to be faxed to [phone]        Discharge Placement     Facility/Agency Request Status Selected? Address Phone Number Fax Number    [facility]    [facility] [address] [facility] [address] [phone]        MARA Cobian 1/21/2017 07:11    Home health arrangements charted by Neha ROCHE RN on 1/20/17 at 14:49.                           Demographic Summary       01/21/17 1228    Referral Information    Admission Type inpatient    Arrived From home or self-care    Record Reviewed history and physical;medical record;plan of care    Contact Information    Permission Granted to Share Information With ;primary care physician    Primary Care Physician Information    Name Wes Lozano            Functional Status       01/21/17 1229    IADL    Medications assistive person   son            Psychosocial      None            Abuse/Neglect     None            Legal     None            Substance Abuse     None            Patient Forms     None          MARA Espana

## 2017-01-21 NOTE — PLAN OF CARE
Problem: Patient Care Overview (Adult)  Goal: Plan of Care Review  Outcome: Ongoing (interventions implemented as appropriate)    01/21/17 1350   Coping/Psychosocial Response Interventions   Plan Of Care Reviewed With patient;family   Outcome Evaluation   Outcome Summary/Follow up Plan pt demonstrates safety w/ gt and t/fers, amb. 400' w/ RW w/ SBA/CGA, performs 20 reps of B LE ther ex, recommend HH PT and intermittent care at home   Patient Care Overview   Progress progress toward functional goals as expected       Goal: Discharge Needs Assessment  Outcome: Ongoing (interventions implemented as appropriate)    01/21/17 1350   Discharge Needs Assessment   Discharge Facility/Level Of Care Needs home with home health   Discharge Disposition home healthcare service   Self-Care   Equipment Currently Used at Home walker, rolling         Problem: Inpatient Physical Therapy  Goal: Transfer Training Goal 1 LTG- PT  Outcome: Ongoing (interventions implemented as appropriate)    01/20/17 0913 01/21/17 1350   Transfer Training PT LTG   Transfer Training PT LTG, Date Established 01/14/17 --    Transfer Training PT LTG, Time to Achieve by discharge --    Transfer Training PT LTG, Activity Type all transfers --    Transfer Training PT LTG, [place] Level conditional independence --    Transfer Training PT LTG, Assist Device walker, rolling --    Transfer Training PT LTG, Date Goal Reviewed --  01/21/17   Transfer Training PT LTG, Outcome --  goal not met       Goal: Gait Training Goal LTG- PT  Outcome: Ongoing (interventions implemented as appropriate)    01/20/17 0913 01/21/17 1350   Gait Training PT LTG   Gait Training Goal PT LTG, Date Established 01/14/17 --    Gait Training Goal PT LTG, Time to Achieve by discharge --    Gait Training Goal PT LTG, [place] Level conditional independence --    Gait Training Goal PT LTG, Assist Device walker, rolling --    Gait Training Goal PT LTG, Distance to Achieve 150 --    Gait  Training Goal PT LTG, Date Goal Reviewed --  01/21/17   Gait Training Goal PT LTG, Outcome --  goal not met       Goal: Stair Training Goal LTG- PT  Outcome: Ongoing (interventions implemented as appropriate)    01/14/17 0913 01/21/17 1350   Stair Training PT LTG   Stair Training Goal PT LTG, Date Established 01/14/17 --    Stair Training Goal PT LTG, Time to Achieve by discharge --    Stair Training Goal PT LTG, Number of Steps 2 --    Stair Training Goal PT LTG, [place] Level independent --    Stair Training Goal PT LTG, Assist Device 2 handrails --    Stair Training Goal PT LTG, Date Goal Reviewed --  01/21/17   Stair Training Goal PT LTG, Outcome --  goal not met

## 2017-01-21 NOTE — PROGRESS NOTES
"Pharmacy Anticoagulation Note - Warfarin    Shefali Kennedy is a 83 y.o.female  [Ht: 63\" (160 cm); Wt: 123 lb 11.6 oz (56.1 kg)] on Warfarin 5 mg PO  for indication of Afib.    Lab Results   Component Value Date    INR 2.20 (H) 01/21/2017    INR 1.2 01/20/2017    INR 1.2 01/19/2017    PROTIME  01/21/2017      Comment:      Fingerstick protime reports without seconds    PROTIME 15.1 01/19/2017    PROTIME 26.0 (H) 01/17/2017     Lab Results   Component Value Date    HGB 13.0 01/18/2017    HGB 11.8 (L) 01/17/2017    HGB 12.5 01/16/2017     Lab Results   Component Value Date    HCT 40.6 01/18/2017    HCT 36.1 01/17/2017    HCT 38.7 01/16/2017     Assessment/Plan:  Reviewed above labs.  Will reduce dose to 1 mg daily, which is pt home dose.  Level is currently at goal range.    Dolores Hill [facility]  01/21/17 2:17 PM     "

## 2017-01-21 NOTE — PROGRESS NOTES
Acute Care - Occupational Therapy Treatment Note  [facility]     Patient Name: Shefali Kennedy  : 1933  MRN: [id number]  Today's Date: 2017               Admit Date: 2017    Visit Dx:     ICD-10-CM ICD-9-CM   1. Impaired mobility and ADLs Z74.09 799.89   2. Chest pain, unspecified type R07.9 786.50     Patient Active Problem List   Diagnosis   • Heart valve replaced   • Parkinson disease   • Paroxysmal atrial fibrillation   • Dizziness   • Hyperlipemia   • Essential hypertension, malignant   • Hypothyroidism, adult   • Chest pain             Adult Rehabilitation Note       17 1121          Rehab Assessment/Intervention    Discipline occupational therapy assistant  -TO      Patient Effort, Rehab Treatment good  -TO      Precautions/Limitations fall precautions;oxygen therapy device and L/min  -TO      Recorded by [TO] DONOVAN Pennington/L      Vital Signs    Pretreatment Heart Rate (beats/min) 75  -TO      Posttreatment Heart Rate (beats/min) 77  -TO      Post SpO2 (%) 98  -TO      Recorded by [TO] DONOVAN Pennington/L      Pain Assessment    Pain Assessment No/denies pain  -TO      Recorded by [TO] DONOVAN Pennington/L      Bed Mobility, Assessment/Treatment    Bed Mob, Supine to Sit, [place] independent  -TO      Recorded by [TO] DONOVAN Pennington/L      Transfer Assessment/Treatment    Transfers, Sit-Stand [place] supervision required  -TO      Transfers, Stand-Sit [place] supervision required  -TO      Recorded by [TO] DONOVAN Pennington/L      Functional Mobility    Functional Mobility- Ind. Level supervision required  -TO      Functional Mobility- Device rolling walker  -TO      Functional Mobility-Distance (Feet) 15  -TO      Recorded by [TO] DONOVAN Pennington/L      Upper Body Bathing Assessment/Training    UB Bathing Assess/Train Assistive Device --   pt required ) AE to complete  -TO      UB Bathing Assess/Train, Position edge of bed  -TO      UB  Bathing Assess/Train, [place] Level conditional independence  -TO      UB Bathing Assess/Train, Impairments other (see comments)  -TO      Recorded by [TO] DUSTIN Pennington      Lower Body Bathing Assessment/Training    LB Bathing Assess/Train, Position edge of bed  -TO      LB Bathing Assess/Train, [place] Level conditional independence  -TO      LB Bathing Assess/Train, Impairments other (see comments)  -TO      LB Bathing Assess/Train, Comment pt performed at min pace  -TO      Recorded by [TO] DUSTIN Pennington      Upper Body Dressing Assessment/Training    UB Dressing Assess/Train, Clothing Type donning:;doffing:;pull over  -TO      UB Dressing Assess/Train, Position edge of bed  -TO      UB Dressing Assess/Train, [place] independent  -TO      Recorded by [TO] DUSTIN Pennington      Toileting Assessment/Training    Toileting Assess/Train, Position sitting  -TO      Toileting Assess/Train, Indepen Level supervision required  -TO      Toileting Assess/Train, Impairments impaired balance  -TO      Recorded by [TO] DUSTIN Pennington      Grooming Assessment/Training    Grooming Assess/Train, Position edge of bed  -TO      Grooming Assess/Train, [place]ep[place] Level supervision required  -TO      Recorded by [TO] DUSTIN Pennington      Positioning and Restraints    Pre-Treatment Position in bed  -TO      Post Treatment Position bed  -TO      In Bed sitting;with family/caregiver  -TO      Recorded by [TO] DUSTIN Pennington        User Key  (r) = Recorded By, (t) = Taken By, (c) = Cosigned By    Initials Name Effective Dates    TO DUSTIN Pennington 10/17/16 -                 OT Goals       01/21/17 1121 01/20/17 1007       Transfer Training OT STG    Transfer Training OT STG, Date Established  01/20/17  -JG     Transfer Training OT STG, Time to Achieve  3 days  -JG     Transfer Training OT STG, [place] Level  independent  -JG     Transfer Training OT STG, Date Goal  Reviewed 01/21/17  -TO 01/20/17  -JG     Transfer Training OT STG, Outcome  goal ongoing  -JG     Bathing OT STG    Bathing Goal OT STG, Date Established  01/20/17  -JG     Bathing Goal OT STG, Time to Achieve  3 days  -JG     Bathing Goal OT STG, [place] Level  independent  -JG     Bathing Goal OT STG, Date Goal Reviewed 01/21/17  -TO 01/20/17  -JG     Bathing Goal OT STG, Outcome goal met  -TO goal ongoing  -JG     LB Dressing OT STG    LB Dressing Goal OT STG, Date Established  01/20/17  -JG     LB Dressing Goal OT STG, Time to Achieve  3 days  -JG     LB Dressing Goal OT STG, [place] Level  independent  -JG     LB Dressing Goal OT STG, Date Goal Reviewed 01/21/17  -TO 01/20/17  -JG     LB Dressing Goal OT STG, Outcome  goal ongoing  -JG     UB Dressing OT STG    UB Dressing Goal OT STG, Date Established  01/20/17  -JG     UB Dressing Goal OT STG, Time to Achieve  3 days  -JG     UB Dressing Goal OT STG, [place] Level  independent  -JG     UB Dressing Goal OT STG, Date Goal Reviewed 01/21/17  -TO 01/20/17  -JG     UB Dressing Goal OT STG, Outcome goal met  -TO goal ongoing  -JG     ADL OT LTG    ADL OT LTG, Date Established  01/20/17  -JG     ADL OT LTG, Time to Achieve  3 days  -JG     ADL OT LTG, [place] Level  independent  -JG     ADL OT LTG, Date Goal Reviewed 01/21/17  -TO 01/20/17  -JG     ADL OT LTG, Outcome goal met  -TO goal ongoing  -JG       User Key  (r) = Recorded By, (t) = Taken By, (c) = Cosigned By    Initials Name Provider Type    ROXYG David Escobar, OT Occupational Therapist    TO DUSTIN Pennington Occupational Therapy Assistant          Occupational Therapy Education     Title: PT OT SLP Therapies (Active)     Topic: Occupational Therapy (Active)     Point: ADL training (Done)    Description: Instruct learner(s) on proper safety adaptation and remediation techniques during self care or transfers.   Instruct in proper use of assistive devices.    Learning Progress  Summary    Learner Readiness Method Response Comment Documented by Status   Patient Eager E DU pt and family understand DME and safety precauions with bathing at home. Precautions in place. TO 01/21/17 1307 Done   Family Eager E DU pt and family understand DME and safety precauions with bathing at home. Precautions in place. TO 01/21/17 1307 Done                      User Key     Initials Effective Dates Name Provider Type Discipline    TO 10/17/16 -  DUSTIN Pennington Occupational Therapy Assistant OT                  OT Recommendation and Plan  Therapy Frequency: other (see comments) (3-14 times a week)  Plan of Care Review  Plan Of Care Reviewed With: patient, caregiver  Progress: improving  Outcome Summary/Follow up Plan: pt performed EOB bathing I with discussion re bathing at home. Pt with precautions in place with family alerted prior to pt beginning ADLS.pt wth DME in place to increased bathing safety. Pt with some fatigue noted post ADLS and would benefit from continued activity tolerance. Pt would benefit from home health at d/c.         Outcome Measures       01/21/17 1300 01/21/17 1121       How much help from another is currently needed...    Putting on and taking off regular lower body clothing?  4  -TO     Bathing (including washing, rinsing, and drying)  4  -TO     Toileting (which includes using toilet bed pan or urinal)  4  -TO     Putting on and taking off regular upper body clothing  4  -TO     Taking care of personal grooming (such as brushing teeth)  4  -TO     Functional Assessment    Outcome Measure Options AM-PAC 6 Clicks Daily Activity (OT)  -TO AM-PAC 6 Clicks Daily Activity (OT)  -TO       User Key  (r) = Recorded By, (t) = Taken By, (c) = Cosigned By    Initials Name Provider Type    TO DUSTIN Pennington Occupational Therapy Assistant           Time Calculation:         Time Calculation- OT       01/21/17 1320          Time Calculation- OT    OT Start Time 1121  -TO      OT  Stop Time 1152  -TO      OT Time Calculation (min) 31 min  -TO        User Key  (r) = Recorded By, (t) = Taken By, (c) = Cosigned By    Initials Name Provider Type    TO DONOVAN Pennington/L Occupational Therapy Assistant           Therapy Charges for Today     Code Description Service Date Service Provider Modifiers Qty    [id number] HC OT SELF CARE/MGMT/TRAIN EA 15 MIN 1/21/2017 DUSTIN Pennington GO 1               DUSTIN Pennington  1/21/2017

## 2017-01-21 NOTE — PROGRESS NOTES
Date of Admission: 2017  Date of Discharge:  2017      Vital Signs past 24hrs  BP range: BP: (122-141)/(62-80) 141/62  Pulse range: Heart Rate:  [60-97] 69  Resp rate range: Resp:  [18] 18  Temp range: Temp (24hrs), Av.5 °F (36.4 °C), Min:96.7 °F (35.9 °C), Max:98.2 °F (36.8 °C)    O2 Device: room airFlow (L/min):  [1.5] 1.5  Oxygen range:SpO2:  [93 %-95 %] 95 %   123 lb 11.6 oz (56.1 kg); Body mass index is 21.92 kg/(m^2).    Intake/Output Summary (Last 24 hours) at 17 1626  Last data filed at 17 1316   Gross per 24 hour   Intake    600 ml   Output      0 ml   Net    600 ml       Physical Exam      Discharge Disposition      Discharge Medications     Your medication list      ASK your doctor about these medications       Instructions Last Dose Given Next Dose Due    albuterol 108 (90 BASE) MCG/ACT inhaler   Commonly known as:  PROVENTIL HFA;VENTOLIN HFA              amiodarone 200 MG tablet   Commonly known as:  PACERONE              amLODIPine 5 MG tablet   Commonly known as:  NORVASC              bumetanide 1 MG tablet   Commonly known as:  BUMEX              diltiaZEM  MG 24 hr capsule   Commonly known as:  CARDIZEM CD              docusate sodium 100 MG capsule   Commonly known as:  COLACE              escitalopram 10 MG tablet   Commonly known as:  LEXAPRO              ezetimibe 10 MG tablet   Commonly known as:  ZETIA              gabapentin 100 MG capsule   Commonly known as:  NEURONTIN              levothyroxine 50 MCG tablet   Commonly known as:  SYNTHROID, LEVOTHROID              lidocaine 5 %   Commonly known as:  LIDODERM              linaclotide 290 MCG capsule capsule   Commonly known as:  LINZESS              lisinopril 20 MG tablet   Commonly known as:  PRINIVIL,ZESTRIL              magnesium oxide 400 (241.3 MG) MG tablet tablet   Commonly known as:  MAGOX              MIRALAX PO              MULTIVITAL PO              nebivolol 10 MG tablet   Commonly known as:   BYSTOLIC              O2   Commonly known as:  OXYGEN              omeprazole 20 MG capsule   Commonly known as:  priLOSEC              oxyCODONE-acetaminophen 5-325 MG per tablet   Commonly known as:  PERCOCET              potassium chloride 10 MEQ CR tablet   Commonly known as:  K-DUR,KLOR-CON              promethazine 25 MG tablet   Commonly known as:  PHENERGAN              warfarin 1 MG tablet   Commonly known as:  COUMADIN              zolpidem 10 MG tablet   Commonly known as:  AMBIEN                    Discharge Diet:  Diet Order   Procedures   • Diet Regular; Cardiac       Activity at Discharge:  As tolerated    Follow-up Appointments  Follow-up Information     Follow up with [facility] .    Specialty:  Home Health Services    Contact information:    [address] [place] [id number] [phone]

## 2017-01-21 NOTE — PLAN OF CARE
Problem: Patient Care Overview (Adult)  Goal: Plan of Care Review  Outcome: Ongoing (interventions implemented as appropriate)    01/21/17 1308   Coping/Psychosocial Response Interventions   Plan Of Care Reviewed With patient;caregiver   Outcome Evaluation   Outcome Summary/Follow up Plan pt performed EOB bathing I with discussion re bathing at home. Pt with precautions in place with family alerted prior to pt beginning ADLS.pt wth DME in place to increased bathing safety. Pt with some fatigue noted post ADLS and would benefit from continued activity tolerance. Pt would benefit from home health at d/c.    Patient Care Overview   Progress improving       Goal: Discharge Needs Assessment  Outcome: Ongoing (interventions implemented as appropriate)    01/21/17 1308   Discharge Needs Assessment   Discharge Facility/Level Of Care Needs home with home health   Self-Care   Equipment Currently Used at Home shower chair;wheelchair;walker, rolling         Problem: Inpatient Occupational Therapy  Goal: Transfer Training Goal 1 STG- OT  Outcome: Ongoing (interventions implemented as appropriate)    01/20/17 1007 01/21/17 1121   Transfer Training OT STG   Transfer Training OT STG, Date Established 01/20/17 --    Transfer Training OT STG, Time to Achieve 3 days --    Transfer Training OT STG, [place] Level independent --    Transfer Training OT STG, Date Goal Reviewed --  01/21/17   Transfer Training OT STG, Outcome goal ongoing --        Goal: Bathing Goal STG- OT  Outcome: Ongoing (interventions implemented as appropriate)    01/20/17 1007 01/21/17 1121   Bathing OT STG   Bathing Goal OT STG, Date Established 01/20/17 --    Bathing Goal OT STG, Time to Achieve 3 days --    Bathing Goal OT STG, [place] Level independent --    Bathing Goal OT STG, Date Goal Reviewed --  01/21/17   Bathing Goal OT STG, Outcome --  goal met       Goal: LB Dressing Goal STG- OT  Outcome: Ongoing (interventions implemented as appropriate)     01/20/17 1007 01/21/17 1121   LB Dressing OT STG   LB Dressing Goal OT STG, Date Established 01/20/17 --    LB Dressing Goal OT STG, Time to Achieve 3 days --    LB Dressing Goal OT STG, [place] Level independent --    LB Dressing Goal OT STG, Date Goal Reviewed --  01/21/17   LB Dressing Goal OT STG, Outcome goal ongoing --        Goal: UB Dressing Goal STG- OT  Outcome: Ongoing (interventions implemented as appropriate)    01/20/17 1007 01/21/17 1121   UB Dressing OT STG   UB Dressing Goal OT STG, Date Established 01/20/17 --    UB Dressing Goal OT STG, Time to Achieve 3 days --    UB Dressing Goal OT STG, [place] Level independent --    UB Dressing Goal OT STG, Date Goal Reviewed --  01/21/17   UB Dressing Goal OT STG, Outcome --  goal met       Goal: ADL Goal LTG- OT  Outcome: Ongoing (interventions implemented as appropriate)    01/20/17 1007 01/21/17 1121   ADL OT LTG   ADL OT LTG, Date Established 01/20/17 --    ADL OT LTG, Time to Achieve 3 days --    ADL OT LTG, [place] Level independent --    ADL OT LTG, Date Goal Reviewed --  01/21/17   ADL OT LTG, Outcome --  goal met

## 2017-01-21 NOTE — PROGRESS NOTES
Acute Care - Physical Therapy Treatment Note  [facility]     Patient Name: Shefali Kennedy  : 1933  MRN: [id number]  Today's Date: 2017             Admit Date: 2017    Visit Dx:    ICD-10-CM ICD-9-CM   1. Impaired mobility and ADLs Z74.09 799.89   2. Chest pain, unspecified type R07.9 786.50     Patient Active Problem List   Diagnosis   • Heart valve replaced   • Parkinson disease   • Paroxysmal atrial fibrillation   • Dizziness   • Hyperlipemia   • Essential hypertension, malignant   • Hypothyroidism, adult   • Chest pain               Adult Rehabilitation Note       17 1320 17 1121       Rehab Assessment/Intervention    Discipline physical therapy assistant  -JW occupational therapy assistant  -TO     Document Type therapy note (daily note)  -      Subjective Information no complaints  -JW      Patient Effort, Rehab Treatment good  -JW good  -TO     Precautions/Limitations  fall precautions;oxygen therapy device and L/min  -TO     Recorded by [JW] Louisa Ramirez PTA [TO] Earl Barkley, MAN/L     Vital Signs    Pretreatment Heart Rate (beats/min) 60  -JW 75  -TO     Intratreatment Heart Rate (beats/min) 84  -JW      Posttreatment Heart Rate (beats/min) 75  -JW 77  -TO     Pre SpO2 (%) 93  -JW      O2 Delivery Pre Treatment supplemental O2  -JW      Intra SpO2 (%) 91  -JW      O2 Delivery Intra Treatment room air  -JW      Post SpO2 (%) 96  -JW 98  -TO     O2 Delivery Post Treatment room air  -JW      Pre Patient Position Supine  -JW      Post Patient Position Sitting  -JW      Recorded by [JW] Louisa Ramirez PTA [TO] Earl Barkley, MAN/L     Pain Assessment    Pain Assessment No/denies pain  -JW No/denies pain  -TO     Recorded by [JW] Louisa Ramirez PTA [TO] Earl Barkley, MAN/L     Bed Mobility, Assessment/Treatment    Bed Mobility, Assistive Device bed rails;head of bed elevated  -      Bed Mobility, Roll Right, [place] conditional  independence  -JW      Bed Mob, Supine to Sit, [place] conditional independence  -JW independent  -TO     Recorded by [JW] Louisa Ramirez, PTA [TO] JR PenningtonA/L     Transfer Assessment/Treatment    Transfers, Sit-Stand [place] stand by assist  -JW supervision required  -TO     Transfers, Stand-Sit [place] stand by assist  -JW supervision required  -TO     Transfers, Sit-Stand-Sit, Assist Device rolling walker  -JW      Recorded by [JW] Louisa Ramirez, PTA [TO] JR PenningtonA/L     Gait Assessment/Treatment    Gait, [place] Level stand by assist;contact guard assist  -JW      Gait, Assistive Device rolling walker  -JW      Gait, Distance (Feet) 400   gt 2 - 100'  -JW      Recorded by [JW] Louisa Ramirez, PTA      Functional Mobility    Functional Mobility- Ind. Level  supervision required  -TO     Functional Mobility- Device  rolling walker  -TO     Functional Mobility-Distance (Feet)  15  -TO     Recorded by  [TO] JR PenningtonA/L     Upper Body Bathing Assessment/Training    UB Bathing Assess/Train Assistive Device  --   pt required no AE to complete  -TO     UB Bathing Assess/Train, Position  edge of bed  -TO     UB Bathing Assess/Train, [place] Level  conditional independence  -TO     UB Bathing Assess/Train, Impairments  --  -TO     Recorded by  [TO] DONOVAN Pennington/L     Lower Body Bathing Assessment/Training    LB Bathing Assess/Train, Position  edge of bed  -TO     LB Bathing Assess/Train, [place] Level  conditional independence  -TO     LB Bathing Assess/Train, Impairments  other (see comments)  -TO     LB Bathing Assess/Train, Comment  pt performed at min pace  -TO     Recorded by  [TO] JR PenningtonA/L     Upper Body Dressing Assessment/Training    UB Dressing Assess/Train, Clothing Type  donning:;doffing:;pull over  -TO     UB Dressing Assess/Train, Position  edge of bed  -TO     UB Dressing Assess/Train, [place]   independent  -TO     Recorded by  [TO] DONOVAN Pennington/L     Toileting Assessment/Training    Toileting Assess/Train, Position  sitting  -TO     Toileting Assess/Train, Indepen Level  supervision required  -TO     Toileting Assess/Train, Impairments  impaired balance  -TO     Recorded by  [TO] DONOVAN Pennington/L     Grooming Assessment/Training    Grooming Assess/Train, Position  edge of bed  -TO     Grooming Assess/Train, Indepen Level  supervision required  -TO     Recorded by  [TO] DONOVAN Pennington/L     Therapy Exercises    Bilateral Lower Extremities AROM:;20 reps;sitting;ankle pumps/circles;glut sets;hip flexion;LAQ  -JW      Recorded by [JW] Louisa Ramirez PTA      Positioning and Restraints    Pre-Treatment Position in bed  -JW in bed  -TO     Post Treatment Position chair  -JW bed  -TO     In Bed supine  -JW sitting;with family/caregiver  -TO     In Chair reclined;call light within reach;with family/caregiver;legs elevated  -JW      Recorded by [JW] Louisa Ramirez PTA [TO] DONOVAN Pennington/L       User Key  (r) = Recorded By, (t) = Taken By, (c) = Cosigned By    Initials Name Effective Dates     Louisa Ramirez PTA 08/11/15 -     TO DUSTIN Pennington 10/17/16 -                 IP PT Goals       01/21/17 1350 01/20/17 0913 01/14/17 0913    Transfer Training PT LTG    Transfer Training PT LTG, Date Established  01/14/17  -MN     Transfer Training PT LTG, Time to Achieve  by discharge  -MN     Transfer Training PT LTG, Activity Type  all transfers  -MN     Transfer Training PT LTG, [place] Level  conditional independence  -MN     Transfer Training PT LTG, Assist Device  walker, rolling  -MN     Transfer Training PT  LTG, Date Goal Reviewed 01/21/17  -JW      Transfer Training PT LTG, Outcome goal not met  -      Gait Training PT LTG    Gait Training Goal PT LTG, Date Established  01/14/17  -MN     Gait Training Goal PT LTG, Time to Achieve  by discharge  -MN      Gait Training Goal PT LTG, [place] Level  conditional independence  -MN     Gait Training Goal PT LTG, Assist Device  walker, rolling  -MN     Gait Training Goal PT LTG, Distance to Achieve  150  -MN     Gait Training Goal PT LTG, Date Goal Reviewed 01/21/17  -      Gait Training Goal PT LTG, Outcome goal not met  -      Stair Training PT LTG    Stair Training Goal PT LTG, Date Established   01/14/17  -MN    Stair Training Goal PT LTG, Time to Achieve   by discharge  -MN    Stair Training Goal PT LTG, Number of Steps   2  -MN    Stair Training Goal PT LTG, [place] Level   independent  -MN    Stair Training Goal PT LTG, Assist Device   2 handrails  -MN    Stair Training Goal PT LTG, Date Goal Reviewed 01/21/17  -      Stair Training Goal PT LTG, Outcome goal not met  -        User Key  (r) = Recorded By, (t) = Taken By, (c) = Cosigned By    Initials Name Provider Type     Louisa Ramirez, TRAVIS Physical Therapy Assistant    GIOVANNA Wheat, PT Physical Therapist          Physical Therapy Education     Title: PT OT SLP Therapies (Active)     Topic: Physical Therapy (Active)     Point: Mobility training (Done)    Learning Progress Summary    Learner Readiness Method Response Comment Documented by Status   Patient Acceptance E DENNISGEORGE   01/21/17 1354 Done   Family Acceptance E DENNIS,GEORGE   01/21/17 1354 Done               Point: Home exercise program (Done)    Learning Progress Summary    Learner Readiness Method Response Comment Documented by Status   Patient Acceptance E DENNIS,GEORGE   01/21/17 1354 Done   Family Acceptance E DENNISGEORGE 01/21/17 1354 Done                      User Key     Initials Effective Dates Name Provider Type [facility] 08/11/15 -  Louisa Ramirez PTA Physical Therapy Assistant PT                    PT Recommendation and Plan     Plan of Care Review  Plan Of Care Reviewed With: patient, family  Progress: progress toward functional goals as expected  Outcome  Summary/Follow up Plan: pt demonstrates safety w/ gt and t/fers, amb. 400' w/ RW w/ SBA/CGA, performs 20 reps of B LE ther ex, recommend HH PT and intermittent care at home          Outcome Measures       01/21/17 1320 01/21/17 1300 01/21/17 1121    How much help from another person do you currently need...    Turning from your back to your side while in flat bed without using bedrails? 4  -JW      Moving from lying on back to sitting on the side of a flat bed without bedrails? 4  -JW      Moving to and from a bed to a chair (including a wheelchair)? 3  -JW      Standing up from a chair using your arms (e.g., wheelchair, bedside chair)? 3  -JW      Climbing 3-5 steps with a railing? 3  -JW      To walk in hospital room? 3  -JW      AM-PAC 6 Clicks Score 20  -JW      How much help from another is currently needed...    Putting on and taking off regular lower body clothing?   4  -TO    Bathing (including washing, rinsing, and drying)   4  -TO    Toileting (which includes using toilet bed pan or urinal)   4  -TO    Putting on and taking off regular upper body clothing   4  -TO    Taking care of personal grooming (such as brushing teeth)   4  -TO    Functional Assessment    Outcome Measure Options AM-PAC 6 Clicks Basic Mobility (PT)  -JW AM-PAC 6 Clicks Daily Activity (OT)  -TO AM-PAC 6 Clicks Daily Activity (OT)  -TO      User Key  (r) = Recorded By, (t) = Taken By, (c) = Cosigned By    Initials Name Provider Type     Louisa Ramirez, TRAVIS Physical Therapy Assistant    TO DONOVAN Pennington/L Occupational Therapy Assistant           Time Calculation:         PT Charges       01/21/17 1355          Time Calculation    Start Time 1320  -      Stop Time 1345  -      Time Calculation (min) 25 min  -      PT Received On 01/21/17  -      PT Goal Re-Cert Due Date 01/27/17  -      Time Calculation- PT    Total Timed Code Minutes- PT 25 minute(s)  -        User Key  (r) = Recorded By, (t) = Taken By, (c) =  Cosigned By    Initials Name Provider Type    JW Louisa Ramirez PTA Physical Therapy Assistant          Therapy Charges for Today     Code Description Service Date Service Provider Modifiers Qty    22563122972 HC GAIT TRAINING EA 15 MIN 1/21/2017 Louisa Ramirez PTA GP 1    81199216204 HC PT THER PROC EA 15 MIN 1/21/2017 Louisa Ramirez PTA GP 1          PT G-Codes  Outcome Measure Options: AM-PAC 6 Clicks Basic Mobility (PT)    Louisa Ramirez PTA  1/21/2017

## 2017-01-21 NOTE — PROGRESS NOTES
hospitalist  Active Problems:    Heart valve replaced    Parkinson disease    Paroxysmal atrial fibrillation    Hyperlipemia    Essential hypertension, malignant    Hypothyroidism, adult    Chest pain        Subjective: Having panic attacks.  Otherwise doing well.  No chest pain or shortness of breath.  No new complaints.  Chart reviewed.  Family members including  and daughter are at bedside.  Past medical history: Positive for panic attacks in the past, was on Lexapro but was taken off one year ago.        Review of Systems:    Review of Systems   All other systems reviewed and are negative.      Objective     Vital Signs: Monitor: Pacemaker rhythm, regular  Temp:  [96.7 °F (35.9 °C)] 96.7 °F (35.9 °C)  Heart Rate:  [66-97] 97  Resp:  [18] 18  BP: (122)/(80) 122/80    Physical Exam:   Physical Exam   Constitutional: She is oriented to person, place, and time. She appears well-developed. No distress.   HENT:   Mouth/Throat: Oropharynx is clear and moist.   Eyes: EOM are normal. No scleral icterus.   Neck: Neck supple. No JVD present.   Cardiovascular: Normal rate and regular rhythm.    Pulmonary/Chest: Effort normal and breath sounds normal.   Abdominal: Soft. She exhibits no distension. There is no tenderness.   Musculoskeletal: Normal range of motion. She exhibits no edema.   Neurological: She is alert and oriented to person, place, and time. No cranial nerve deficit.   Skin: Skin is warm and dry. No rash noted. She is not diaphoretic. No erythema.   Psychiatric: She has a normal mood and affect. Her behavior is normal.          Results Review:       Lab Results (last 24 hours)     Procedure Component Value Units Date/Time    Protime-INR, Fingerstick [[id number]]  (Abnormal) Collected:  01/21/17 0728    Specimen:  Capillary Blood Updated:  01/21/17 0730     Protime -- Seconds       Fingerstick protime reports without seconds        INR 2.20 (H)     Narrative:       Therapeutic range for most indications is  2.0-3.0 INR,  or 2.5-3.5 for mechanical heart valves.        Imaging Results (last 24 hours)     ** No results found for the last 24 hours. **              Medication Review: medications have been reviewed    Assessment/Plan:  1.  Paroxysmal atrial fibrillation with rapid ventricular response: Improved now, currently in pacemaker rhythm.  Not a candidate for ablation due to high risk, medical management as per cardiologist Dr. True Starks time is today therapeutic, continue 1 more dose of Lovenox, plan for discharge tomorrow  2.  Hypertension: Controlled  3.  Chest pain: Resolved  4.  Status post AVR, Peep valve  5.  Status post pacemaker placement  6.  Status post IVC filter placement  7.  Hypothyroidism: Stable  8.  Chronic backache: Controlled  9.  History of anxiety with recurrent panic attacks: Start on Lexapro    Length of stay: More than 2 midnights  DVT prophylaxis: Warfarin and Lovenox  CODE STATUS: Full code  Disposition: Home, probably tomorrow  Discussed with family members  Time spent 25 minutes or more      Melisa Butts MD  01/21/17  2:04 PM

## 2017-01-22 NOTE — THERAPY DISCHARGE NOTE
Acute Care - Physical Therapy Discharge Summary  [facility]       Patient Name: Shefali Kennedy  : 1933  MRN: [id number]    Today's Date: 2017                 Admit Date: 2017      PT Recommendation and Plan    Visit Dx:    ICD-10-CM ICD-9-CM   1. Impaired mobility and ADLs Z74.09 799.89   2. Chest pain, unspecified type R07.9 786.50             Outcome Measures       17 1320 17 1300 17 1121    How much help from another person do you currently need...    Turning from your back to your side while in flat bed without using bedrails? 4  -JW      Moving from lying on back to sitting on the side of a flat bed without bedrails? 4  -JW      Moving to and from a bed to a chair (including a wheelchair)? 3  -JW      Standing up from a chair using your arms (e.g., wheelchair, bedside chair)? 3  -JW      Climbing 3-5 steps with a railing? 3  -JW      To walk in hospital room? 3  -JW      AM-PAC 6 Clicks Score 20  -JW      How much help from another is currently needed...    Putting on and taking off regular lower body clothing?   4  -TO    Bathing (including washing, rinsing, and drying)   4  -TO    Toileting (which includes using toilet bed pan or urinal)   4  -TO    Putting on and taking off regular upper body clothing   4  -TO    Taking care of personal grooming (such as brushing teeth)   4  -TO    Functional Assessment    Outcome Measure Options AM-PAC 6 Clicks Basic Mobility (PT)  -JW AM-PAC 6 Clicks Daily Activity (OT)  -TO AM-PAC 6 Clicks Daily Activity (OT)  -TO      User Key  (r) = Recorded By, (t) = Taken By, (c) = Cosigned By    Initials Name Provider Type     Louisa Ramirez PTA Physical Therapy Assistant    TO DUSTIN Pennington Occupational Therapy Assistant                      IP PT Goals       17 1607 17 1350 17 0913    Transfer Training PT LTG    Transfer Training PT LTG, Date Established   17  -MN    Transfer Training PT LTG,  Time to Achieve by discharge  -GB  by discharge  -MN    Transfer Training PT LTG, Activity Type   all transfers  -MN    Transfer Training PT LTG, [place] Level   conditional independence  -MN    Transfer Training PT LTG, Assist Device   walker, rolling  -MN    Transfer Training PT  LTG, Date Goal Reviewed  01/21/17  -JW     Transfer Training PT LTG, Outcome goal not met  -GB goal not met  -JW     Transfer Training PT LTG, Reason Goal Not Met discharged from facility  -GB      Gait Training PT LTG    Gait Training Goal PT LTG, Date Established 01/20/17  -GB  01/14/17  -MN    Gait Training Goal PT LTG, Time to Achieve by discharge  -GB  by discharge  -MN    Gait Training Goal PT LTG, [place] Level   conditional independence  -MN    Gait Training Goal PT LTG, Assist Device   walker, rolling  -MN    Gait Training Goal PT LTG, Distance to Achieve   150  -MN    Gait Training Goal PT LTG, Date Goal Reviewed  01/21/17  -JW     Gait Training Goal PT LTG, Outcome goal not met  -GB goal not met  -     Gait Training Goal PT LTG, Reason Goal Not Met discharged from facility  -      Stair Training PT LTG    Stair Training Goal PT LTG, Date Goal Reviewed  01/21/17  -     Stair Training Goal PT LTG, Outcome goal not met  -GB goal not met  -     Stair Training Goal PT LTG, Reason Goal Not Met discharged from facility  -        01/14/17 0913          Stair Training PT LTG    Stair Training Goal PT LTG, Date Established 01/14/17  -MN      Stair Training Goal PT LTG, Time to Achieve by discharge  -MN      Stair Training Goal PT LTG, Number of Steps 2  -MN      Stair Training Goal PT LTG, [place] Level independent  -MN      Stair Training Goal PT LTG, Assist Device 2 handrails  -MN        User Key  (r) = Recorded By, (t) = Taken By, (c) = Cosigned By    Initials Name Provider Type    AYE Ramirez, PTA Physical Therapy Assistant    KRISTAL Murray, PT Physical Therapist    GIOVANNA NERI  Mary Alice, PT Physical Therapist              PT Discharge Summary  Reason for Discharge: Discharge from facility  Outcomes Achieved: Unable to make functional progress toward goals at this time      Marie Murray, PT   1/22/2017

## 2017-01-22 NOTE — PLAN OF CARE
Problem: Inpatient Occupational Therapy  Goal: Transfer Training Goal 1 STG- OT  Outcome: Outcome(s) achieved Date Met:  01/22/17 01/20/17 1007 01/22/17 0756   Transfer Training OT STG   Transfer Training OT STG, Date Established 01/20/17 --    Transfer Training OT STG, Time to Achieve 3 days --    Transfer Training OT STG, [place] Level independent --    Transfer Training OT STG, Date Goal Reviewed --  01/22/17   Transfer Training OT STG, Outcome goal ongoing --    Transfer Training OT STG, Reason Goal Not Met --  discharged from facility       Goal: Bathing Goal STG- OT  Outcome: Outcome(s) achieved Date Met:  01/22/17 01/20/17 1007 01/21/17 1121 01/22/17 0756   Bathing OT STG   Bathing Goal OT STG, Date Established 01/20/17 --  --    Bathing Goal OT STG, Time to Achieve 3 days --  --    Bathing Goal OT STG, [place] Level independent --  --    Bathing Goal OT STG, Date Goal Reviewed --  --  01/22/17   Bathing Goal OT STG, Outcome --  goal met --    Bathing Goal OT STG, Reason Goal Not Met --  --  discharged from facility       Goal: LB Dressing Goal STG- OT  Outcome: Outcome(s) achieved Date Met:  01/22/17 01/20/17 1007 01/22/17 0756   LB Dressing OT STG   LB Dressing Goal OT STG, Date Established 01/20/17 --    LB Dressing Goal OT STG, Time to Achieve 3 days --    LB Dressing Goal OT STG, [place] Level independent --    LB Dressing Goal OT STG, Date Goal Reviewed --  01/22/17   LB Dressing Goal OT STG, Outcome goal ongoing --    LB Dressing Goal OT STG, Reason Goal Not Met --  discharged from facility       Goal: UB Dressing Goal STG- OT  Outcome: Outcome(s) achieved Date Met:  01/22/17 01/20/17 1007 01/21/17 1121 01/22/17 0756   UB Dressing OT STG   UB Dressing Goal OT STG, Date Established 01/20/17 --  --    UB Dressing Goal OT STG, Time to Achieve 3 days --  --    UB Dressing Goal OT STG, [place] Level independent --  --    UB Dressing Goal OT STG, Date Goal Reviewed --  --   01/22/17   UB Dressing Goal OT STG, Outcome --  goal met --    UB Dressing Goal OT STG, Reason Goal Not Met --  --  discharged from facility       Goal: ADL Goal LTG- OT  Outcome: Outcome(s) achieved Date Met:  01/22/17 01/22/17 0756   ADL OT LTG   ADL OT LTG, Date Goal Reviewed 01/22/17   ADL OT LTG, Reason Goal Not Met discharged from facility

## 2017-01-22 NOTE — THERAPY DISCHARGE NOTE
Acute Care - Occupational Therapy Discharge Summary  [facility]     Patient Name: Shefali Kennedy  : 1933  MRN: [id number]    Today's Date: 2017                 Admit Date: 2017        OT Recommendation and Plan    Visit Dx:    ICD-10-CM ICD-9-CM   1. Impaired mobility and ADLs Z74.09 799.89   2. Chest pain, unspecified type R07.9 786.50                     OT Goals       17 0756 17 1121 17 1007    Transfer Training OT STG    Transfer Training OT STG, Date Established   17  -JG    Transfer Training OT STG, Time to Achieve   3 days  -JG    Transfer Training OT STG, [place] Level   independent  -JG    Transfer Training OT STG, Date Goal Reviewed 17  -LW 17  -TO 17  -JG    Transfer Training OT STG, Outcome   goal ongoing  -JG    Transfer Training OT STG, Reason Goal Not Met discharged from facility  -LW      Bathing OT STG    Bathing Goal OT STG, Date Established   17  -JG    Bathing Goal OT STG, Time to Achieve   3 days  -JG    Bathing Goal OT STG, [place] Level   independent  -JG    Bathing Goal OT STG, Date Goal Reviewed 17  -LW 17  -TO 17  -JG    Bathing Goal OT STG, Outcome  goal met  -TO goal ongoing  -JG    Bathing Goal OT STG, Reason Goal Not Met discharged from facility  -LW      LB Dressing OT STG    LB Dressing Goal OT STG, Date Established   17  -JG    LB Dressing Goal OT STG, Time to Achieve   3 days  -JG    LB Dressing Goal OT STG, [place] Level   independent  -JG    LB Dressing Goal OT STG, Date Goal Reviewed 17  -LW 17  -TO 17  -JG    LB Dressing Goal OT STG, Outcome   goal ongoing  -JG    LB Dressing Goal OT STG, Reason Goal Not Met discharged from facility  -LW      UB Dressing OT STG    UB Dressing Goal OT STG, Date Established   17  -JG    UB Dressing Goal OT STG, Time to Achieve   3 days  -JG    UB Dressing Goal OT STG, [place] Level   independent  -JG    UB  Dressing Goal OT STG, Date Goal Reviewed 01/22/17  -LW 01/21/17  -TO 01/20/17  -JG    UB Dressing Goal OT STG, Outcome  goal met  -TO goal ongoing  -JG    UB Dressing Goal OT STG, Reason Goal Not Met discharged from facility  -LW      ADL OT LTG    ADL OT LTG, Date Established   01/20/17  -JG    ADL OT LTG, Time to Achieve   3 days  -JG    ADL OT LTG, [place] Level   independent  -JG    ADL OT LTG, Date Goal Reviewed 01/22/17  -LW 01/21/17  -TO 01/20/17  -JG    ADL OT LTG, Outcome  goal met  -TO goal ongoing  -JG    ADL OT LTG, Reason Goal Not Met discharged from facility  -LW        User Key  (r) = Recorded By, (t) = Taken By, (c) = Cosigned By    Initials Name Provider Type    ALEX Escobar, OT Occupational Therapist    TO DUSTIN Pennington Occupational Therapy Assistant    CHEN Hernandez, OT Occupational Therapist                Outcome Measures       01/21/17 1320 01/21/17 1300 01/21/17 1121    How much help from another person do you currently need...    Turning from your back to your side while in flat bed without using bedrails? 4  -JW      Moving from lying on back to sitting on the side of a flat bed without bedrails? 4  -JW      Moving to and from a bed to a chair (including a wheelchair)? 3  -JW      Standing up from a chair using your arms (e.g., wheelchair, bedside chair)? 3  -JW      Climbing 3-5 steps with a railing? 3  -JW      To walk in hospital room? 3  -JW      AM-PAC 6 Clicks Score 20  -JW      How much help from another is currently needed...    Putting on and taking off regular lower body clothing?   4  -TO    Bathing (including washing, rinsing, and drying)   4  -TO    Toileting (which includes using toilet bed pan or urinal)   4  -TO    Putting on and taking off regular upper body clothing   4  -TO    Taking care of personal grooming (such as brushing teeth)   4  -TO    Functional Assessment    Outcome Measure Options AM-PAC 6 Clicks Basic Mobility (PT)  -JW AM-PAC 6 Clicks  Daily Activity (OT)  -TO AM-PAC 6 Clicks Daily Activity (OT)  -TO      User Key  (r) = Recorded By, (t) = Taken By, (c) = Cosigned By    Initials Name Provider Type    AYE Ramirez PTA Physical Therapy Assistant    TO DONOVAN Pennington/L Occupational Therapy Assistant              OT Discharge Summary  Anticipated Discharge Disposition: home  Reason for Discharge: Discharge from facility  Outcomes Achieved: Refer to plan of care for updates on goals achieved  Discharge Destination: Home      Grace Hernandez OT  1/22/2017

## 2017-01-24 NOTE — THERAPY DISCHARGE NOTE
Acute Care - Occupational Therapy Discharge Summary  [facility]     Patient Name: Shefali Kennedy  : 1933  MRN: [id number]    Today's Date: 2017                 Admit Date: 2017        OT Recommendation and Plan    Visit Dx:    ICD-10-CM ICD-9-CM   1. Impaired mobility and ADLs Z74.09 799.89   2. Chest pain, unspecified type R07.9 786.50                     OT Goals       17 0756 17 1121 17 1007    Transfer Training OT STG    Transfer Training OT STG, Date Established   17  -JG    Transfer Training OT STG, Time to Achieve   3 days  -JG    Transfer Training OT STG, [place] Level   independent  -JG    Transfer Training OT STG, Date Goal Reviewed 17  -LW 17  -TO 17  -JG    Transfer Training OT STG, Outcome   goal ongoing  -JG    Transfer Training OT STG, Reason Goal Not Met discharged from facility  -LW      Bathing OT STG    Bathing Goal OT STG, Date Established   17  -JG    Bathing Goal OT STG, Time to Achieve   3 days  -JG    Bathing Goal OT STG, [place] Level   independent  -JG    Bathing Goal OT STG, Date Goal Reviewed 17  -LW 17  -TO 17  -JG    Bathing Goal OT STG, Outcome  goal met  -TO goal ongoing  -JG    Bathing Goal OT STG, Reason Goal Not Met discharged from facility  -LW      LB Dressing OT STG    LB Dressing Goal OT STG, Date Established   17  -JG    LB Dressing Goal OT STG, Time to Achieve   3 days  -JG    LB Dressing Goal OT STG, [place] Level   independent  -JG    LB Dressing Goal OT STG, Date Goal Reviewed 17  -LW 17  -TO 17  -JG    LB Dressing Goal OT STG, Outcome   goal ongoing  -JG    LB Dressing Goal OT STG, Reason Goal Not Met discharged from facility  -LW      UB Dressing OT STG    UB Dressing Goal OT STG, Date Established   17  -JG    UB Dressing Goal OT STG, Time to Achieve   3 days  -JG    UB Dressing Goal OT STG, [place] Level   independent  -JG    UB  Dressing Goal OT STG, Date Goal Reviewed 01/22/17  -LW 01/21/17  -TO 01/20/17  -JG    UB Dressing Goal OT STG, Outcome  goal met  -TO goal ongoing  -JG    UB Dressing Goal OT STG, Reason Goal Not Met discharged from facility  -LW      ADL OT LTG    ADL OT LTG, Date Established   01/20/17  -JG    ADL OT LTG, Time to Achieve   3 days  -JG    ADL OT LTG, [place] Level   independent  -JG    ADL OT LTG, Date Goal Reviewed 01/22/17  -LW 01/21/17  -TO 01/20/17  -JG    ADL OT LTG, Outcome  goal met  -TO goal ongoing  -JG    ADL OT LTG, Reason Goal Not Met discharged from facility  -LW        User Key  (r) = Recorded By, (t) = Taken By, (c) = Cosigned By    Initials Name Provider Type    ALEX Escobar, OT Occupational Therapist    TO DUSTIN Pennington Occupational Therapy Assistant    CHEN Hernandez, OT Occupational Therapist                Outcome Measures       01/21/17 1320 01/21/17 1300 01/21/17 1121    How much help from another person do you currently need...    Turning from your back to your side while in flat bed without using bedrails? 4  -JW      Moving from lying on back to sitting on the side of a flat bed without bedrails? 4  -JW      Moving to and from a bed to a chair (including a wheelchair)? 3  -JW      Standing up from a chair using your arms (e.g., wheelchair, bedside chair)? 3  -JW      Climbing 3-5 steps with a railing? 3  -JW      To walk in hospital room? 3  -JW      AM-PAC 6 Clicks Score 20  -JW      How much help from another is currently needed...    Putting on and taking off regular lower body clothing?   4  -TO    Bathing (including washing, rinsing, and drying)   4  -TO    Toileting (which includes using toilet bed pan or urinal)   4  -TO    Putting on and taking off regular upper body clothing   4  -TO    Taking care of personal grooming (such as brushing teeth)   4  -TO    Functional Assessment    Outcome Measure Options AM-PAC 6 Clicks Basic Mobility (PT)  -JW AM-PAC 6 Clicks  Daily Activity (OT)  -TO AM-PAC 6 Clicks Daily Activity (OT)  -TO      User Key  (r) = Recorded By, (t) = Taken By, (c) = Cosigned By    Initials Name Provider Type    AYE Ramirez, TRAVIS Physical Therapy Assistant    TO DONOVAN Pennington/L Occupational Therapy Assistant              OT Discharge Summary  Anticipated Discharge Disposition: home with home health  Reason for Discharge: All goals achieved, Discharge from facility  Outcomes Achieved: Able to achieve all goals within established timeline  Discharge Destination: Home with home health      David Escobar, OT  1/24/2017

## 2017-02-17 ENCOUNTER — OFFICE VISIT (OUTPATIENT)
Dept: CARDIOLOGY | Facility: CLINIC | Age: 82
End: 2017-02-17

## 2017-02-17 ENCOUNTER — DOCUMENTATION (OUTPATIENT)
Dept: CARDIOLOGY | Facility: CLINIC | Age: 82
End: 2017-02-17

## 2017-02-17 VITALS
WEIGHT: 123 LBS | OXYGEN SATURATION: 96 % | BODY MASS INDEX: 21.79 KG/M2 | HEIGHT: 63 IN | SYSTOLIC BLOOD PRESSURE: 117 MMHG | HEART RATE: 64 BPM | DIASTOLIC BLOOD PRESSURE: 70 MMHG

## 2017-02-17 DIAGNOSIS — R42 DIZZINESS: ICD-10-CM

## 2017-02-17 DIAGNOSIS — I10 ESSENTIAL HYPERTENSION: ICD-10-CM

## 2017-02-17 DIAGNOSIS — Z95.2 S/P AVR (AORTIC VALVE REPLACEMENT): ICD-10-CM

## 2017-02-17 DIAGNOSIS — I48.0 PAROXYSMAL ATRIAL FIBRILLATION (HCC): Primary | ICD-10-CM

## 2017-02-17 DIAGNOSIS — E78.2 MIXED HYPERLIPIDEMIA: ICD-10-CM

## 2017-02-17 PROCEDURE — 93000 ELECTROCARDIOGRAM COMPLETE: CPT | Performed by: INTERNAL MEDICINE

## 2017-02-17 PROCEDURE — 99213 OFFICE O/P EST LOW 20 MIN: CPT | Performed by: INTERNAL MEDICINE

## 2017-02-17 RX ORDER — LORAZEPAM 0.5 MG/1
0.5 TABLET ORAL EVERY 8 HOURS PRN
COMMUNITY

## 2017-02-17 RX ORDER — OXYCODONE AND ACETAMINOPHEN 10; 325 MG/1; MG/1
1 TABLET ORAL EVERY 6 HOURS PRN
COMMUNITY

## 2017-02-17 RX ORDER — DOFETILIDE 0.12 MG/1
125 CAPSULE ORAL 2 TIMES DAILY
Qty: 60 CAPSULE | Refills: 6 | Status: SHIPPED | OUTPATIENT
Start: 2017-02-17

## 2017-02-17 NOTE — PROGRESS NOTES
Shefali Kennedy  83 y.o. female    12/09/2016  1. Paroxysmal atrial fibrillation    2. Mixed hyperlipidemia    3. S/P AVR (aortic valve replacement)    4. Dizziness    5. Essential hypertension      Reason for visit today-feeling weak and labile blood pressure      History of present illness:    Ms. Kennedy is here with feeling weak when she goes into atrial flutter with RVR, right now she is back in sinus rhythm and she had to decrease the dose of Tikosyn due to heaving when she takes twice a day.  She is on chronic Coumadin therapy.  She was attempted for ablation by Dr. Archer and he had to abandon it due to prior history of IVC filter.  At this point she wants to go to [place] and see Dr. Robi Wright and consider flutter ablation as it is making her quality of life poorly.  She is going to think about it and let me know and I'll arrange for the appointment at [place].  She has Parkinson's and she is on a wheelchair due to the tremor.        Allergies   Allergen Reactions   • Amoxicillin-Pot Clavulanate    • Aspirin      Cant take because shes on blood thinner  Cant take because shes on blood thinner   • Latex    • Penicillins    • Simvastatin    • Bacitracin Rash         Past Medical History   Diagnosis Date   • CHF (congestive heart failure)    • Disease of thyroid gland    • DVT (deep venous thrombosis)    • Hyperlipidemia    • Hypertension    • Palpitations    • Paroxysmal atrial fibrillation          Past Surgical History   Procedure Laterality Date   • Hysterectomy     • Aortic valve repair/replacement     • Insert / replace / remove pacemaker     • Gallbladder surgery     • Appendectomy           Family History   Problem Relation Age of Onset   • Heart disease Mother    • Heart disease Father          Social History     Social History   • Marital status:      Spouse name: N/A   • Number of children: N/A   • Years of education: N/A     Occupational History   • Not on file.     Social History  Main Topics   • Smoking status: Never Smoker   • Smokeless tobacco: Never Used   • Alcohol use No   • Drug use: No   • Sexual activity: Defer     Other Topics Concern   • Not on file     Social History Narrative         Current Outpatient Prescriptions   Medication Sig Dispense Refill   • albuterol (PROVENTIL HFA;VENTOLIN HFA) 108 (90 BASE) MCG/ACT inhaler Inhale 2 puffs Every 6 (Six) Hours.     • bumetanide (BUMEX) 1 MG tablet Take 0.5 mg by mouth 2 (Two) Times a Day.     • diltiaZEM CD (CARDIZEM CD) 240 MG 24 hr capsule Take 240 mg by mouth Daily.     • docusate sodium (COLACE) 100 MG capsule Take 100 mg by mouth 2 (Two) Times a Day.     • dofetilide (TIKOSYN) 250 MCG capsule Take 1 capsule by mouth Every 12 (Twelve) Hours. (Patient taking differently: Take 250 mcg by mouth Daily.) 60 capsule 0   • levothyroxine (SYNTHROID, LEVOTHROID) 50 MCG tablet Take 50 mcg by mouth Daily.     • lidocaine (LIDODERM) 5 % Place  on the skin Daily.  3   • LORazepam (ATIVAN) 0.5 MG tablet Take 0.5 mg by mouth Every 8 (Eight) Hours As Needed for anxiety.     • magnesium oxide (MAGOX) 400 (241.3 MG) MG tablet tablet Take 400 mg by mouth daily.     • Multiple Vitamins-Minerals (MULTIVITAL PO) Take 1 tablet by mouth Daily.     • nitroglycerin (NITROSTAT) 0.4 MG SL tablet Place 1 tablet under the tongue Every 5 (Five) Minutes As Needed for chest pain. Take no more than 3 doses in 15 minutes. 25 tablet 0   • O2 (OXYGEN) 2 L into each nostril Daily.     • omeprazole (PriLOSEC) 20 MG capsule Take 20 mg by mouth daily.     • oxyCODONE-acetaminophen (PERCOCET)  MG per tablet Take 1 tablet by mouth Every 6 (Six) Hours As Needed for moderate pain (4-6).     • Polyethylene Glycol 3350 (MIRALAX PO) Take 17 g by mouth Daily.     • potassium chloride (K-DUR,KLOR-CON) 10 MEQ CR tablet Take 10 mEq by mouth daily.     • promethazine (PHENERGAN) 25 MG tablet Take 25 mg by mouth Every 4 (Four) Hours As Needed for nausea or vomiting.     •  "warfarin (COUMADIN) 1 MG tablet Take 1 mg by mouth Take as directed.     • zolpidem (AMBIEN) 10 MG tablet Take 10 mg by mouth At Night As Needed for sleep.       No current facility-administered medications for this visit.          OBJECTIVE    Visit Vitals   • /70   • Pulse 64   • Ht 63\" (160 cm)   • Wt 123 lb (55.8 kg)   • SpO2 96%   • BMI 21.79 kg/m2                   Physical Exam     Constitutional: Cooperative, alert and oriented, well-developed, well-nourished, in no acute distress.     HENT:   Head: Normocephalic,  Ears, Nose, and Throat: No gross abnormalities.  Eyes: EOMS intact, PERRL,  Neck, no thyromegaly, no JVD, carotid pulses are full and equal bilaterally and without  Bruits.     Cardiovascular: Regular rhythm, S1 and S2 normal, no S3 or S4.  Prosthetic valve sound heard with a click    Pulmonary/Chest: Chest: normal symmetry, no tenderness to palpation, normal respiratory excursion, no intercostal retraction, no use of accessory muscles.            Pulmonary: Normal breath sounds. No rales or ronchi. Incision site healed well.    Abdominal: Abdomen soft, bowel sounds normoactive, no masses,    Musculoskeletal: No deformities, she is in a wheelchair due to tremor from Parkinson's    Neurological: Tremor with rigidity due to Parkinson's, oriented to place and time.    Skin: Warm and dry to the touch, no apparent skin lesions or masses noted.     Psychiatric: She has a normal mood and affect. Her behavior is normal. Judgment and thought content normal.           ECG 12 Lead  Date/Time: 2/17/2017 10:34 AM  Performed by: BRENDA SANDHU  Authorized by: BRENDA SANDHU   Comparison: not compared with previous ECG   Comments: Atrial paced with ventricular sensed with LVH and baseline artifact              Lab Results   Component Value Date    WBC 6.6 01/18/2017    HGB 13.0 01/18/2017    HCT 40.6 01/18/2017    MCV 89.4 01/18/2017     01/18/2017     Lab Results   Component Value Date "    GLUCOSE 73 01/19/2017    BUN 10 01/19/2017    CREATININE 0.7 01/19/2017    CO2 28 01/19/2017    CALCIUM 8.4 01/19/2017    ALBUMIN 2.6 (L) 01/18/2017    AST 21 01/18/2017    ALT 23 01/18/2017     No results found for: CHOL  Lab Results   Component Value Date    TRIG 129 01/12/2017     Lab Results   Component Value Date    HDL 39 (L) 01/12/2017     Lab Results   Component Value Date    LDLCALC 98 01/12/2017     No results found for: LDL  No results found for: HDLLDLRATIO  No components found for: CHOLHDL  Lab Results   Component Value Date    HGBA1C 6.1 (H) 01/12/2017     Lab Results   Component Value Date    TSH 3.54 01/12/2017           ASSESSMENT AND PLAN    Atrial flutter-paroxysmal, history of dual-chamber pacemaker-on dofetilide  250 µg daily as she cannot tolerate twice a day due to heaving.  On Coumadin for anti-correlation INR is Between 2 and 3.    Hypertension reasonably controlled with Cardizem CD and Bumex.    History of prior to placement of the bioprosthetic valve-doing well.    Anxiety-on Ativan and takes oxycodone for severe arthritis.    rTue Summers MD  2/17/2017  10:33 AM